# Patient Record
Sex: MALE | Race: WHITE | Employment: OTHER | URBAN - METROPOLITAN AREA
[De-identification: names, ages, dates, MRNs, and addresses within clinical notes are randomized per-mention and may not be internally consistent; named-entity substitution may affect disease eponyms.]

---

## 2017-01-04 ENCOUNTER — APPOINTMENT (OUTPATIENT)
Dept: CT IMAGING | Facility: HOSPITAL | Age: 77
DRG: 197 | End: 2017-01-04
Attending: EMERGENCY MEDICINE
Payer: MEDICARE

## 2017-01-04 ENCOUNTER — HOSPITAL ENCOUNTER (INPATIENT)
Facility: HOSPITAL | Age: 77
LOS: 2 days | Discharge: HOME | DRG: 197 | End: 2017-01-06
Attending: EMERGENCY MEDICINE | Admitting: INTERNAL MEDICINE
Payer: MEDICARE

## 2017-01-04 DIAGNOSIS — R06.00 DYSPNEA ON EXERTION: Primary | ICD-10-CM

## 2017-01-04 DIAGNOSIS — R77.8 ELEVATED TROPONIN: ICD-10-CM

## 2017-01-04 PROBLEM — J96.21 ACUTE ON CHRONIC RESPIRATORY FAILURE WITH HYPOXEMIA (HCC): Status: ACTIVE | Noted: 2017-01-04

## 2017-01-04 LAB
ALBUMIN SERPL-MCNC: 3.2 G/DL (ref 3.5–4.8)
ALLENS TEST: POSITIVE
ALP LIVER SERPL-CCNC: 60 U/L (ref 45–117)
ALT SERPL-CCNC: 23 U/L (ref 17–63)
APTT PPP: 35.3 SECONDS (ref 25–34)
ARTERIAL BLD GAS O2 SATURATION: 96 % (ref 92–100)
ARTERIAL BLOOD GAS BASE EXCESS: -0.6
ARTERIAL BLOOD GAS HCO3: 22.7 MEQ/L (ref 22–26)
ARTERIAL BLOOD GAS PCO2: 33 MM HG (ref 35–45)
ARTERIAL BLOOD GAS PH: 7.45 (ref 7.35–7.45)
ARTERIAL BLOOD GAS PO2: 94 MM HG (ref 80–105)
AST SERPL-CCNC: 16 U/L (ref 15–41)
ATRIAL RATE: 77 BPM
BASOPHILS # BLD AUTO: 0.02 X10(3) UL (ref 0–0.1)
BASOPHILS NFR BLD AUTO: 0.3 %
BILIRUB SERPL-MCNC: 0.4 MG/DL (ref 0.1–2)
BUN BLD-MCNC: 14 MG/DL (ref 8–20)
CALCIUM BLD-MCNC: 8.7 MG/DL (ref 8.3–10.3)
CALCULATED O2 SATURATION: 98 % (ref 92–100)
CARBOXYHEMOGLOBIN: 1.7 % SAT (ref 0–3)
CHLORIDE: 103 MMOL/L (ref 101–111)
CO2: 27 MMOL/L (ref 22–32)
CREAT BLD-MCNC: 1.38 MG/DL (ref 0.7–1.3)
D-DIMER: 2.25 UG/ML FEU (ref 0–0.49)
EOSINOPHIL # BLD AUTO: 0.17 X10(3) UL (ref 0–0.3)
EOSINOPHIL NFR BLD AUTO: 2.4 %
ERYTHROCYTE [DISTWIDTH] IN BLOOD BY AUTOMATED COUNT: 15.5 % (ref 11.5–16)
GLUCOSE BLD-MCNC: 173 MG/DL (ref 65–99)
GLUCOSE BLD-MCNC: 248 MG/DL (ref 70–99)
HCT VFR BLD AUTO: 39.2 % (ref 37–53)
HGB BLD-MCNC: 12.7 G/DL (ref 13–17)
IMMATURE GRANULOCYTE COUNT: 0.03 X10(3) UL (ref 0–1)
IMMATURE GRANULOCYTE RATIO %: 0.4 %
INR BLD: 1.06 (ref 0.89–1.12)
L/M: 6 L/MIN
LYMPHOCYTES # BLD AUTO: 1.21 X10(3) UL (ref 0.9–4)
LYMPHOCYTES NFR BLD AUTO: 16.9 %
M PROTEIN MFR SERPL ELPH: 7 G/DL (ref 6.1–8.3)
MCH RBC QN AUTO: 25.4 PG (ref 27–33.2)
MCHC RBC AUTO-ENTMCNC: 32.4 G/DL (ref 31–37)
MCV RBC AUTO: 78.4 FL (ref 80–99)
METHEMOGLOBIN: 0.4 % SAT (ref 0.4–1.5)
MONOCYTES # BLD AUTO: 0.49 X10(3) UL (ref 0.1–0.6)
MONOCYTES NFR BLD AUTO: 6.8 %
NEUTROPHIL ABS PRELIM: 5.24 X10 (3) UL (ref 1.3–6.7)
NEUTROPHILS # BLD AUTO: 5.24 X10(3) UL (ref 1.3–6.7)
NEUTROPHILS NFR BLD AUTO: 73.2 %
P AXIS: 15 DEGREES
P-R INTERVAL: 154 MS
PATIENT TEMPERATURE: 98.6 F
PLATELET # BLD AUTO: 197 10(3)UL (ref 150–450)
POTASSIUM SERPL-SCNC: 4.8 MMOL/L (ref 3.6–5.1)
PSA SERPL DL<=0.01 NG/ML-MCNC: 14.1 SECONDS (ref 12.3–14.8)
Q-T INTERVAL: 394 MS
QRS DURATION: 78 MS
QTC CALCULATION (BEZET): 445 MS
R AXIS: -34 DEGREES
RBC # BLD AUTO: 5 X10(6)UL (ref 3.8–5.8)
RED CELL DISTRIBUTION WIDTH-SD: 42.8 FL (ref 35.1–46.3)
SODIUM SERPL-SCNC: 138 MMOL/L (ref 136–144)
T AXIS: -55 DEGREES
TOTAL HEMOGLOBIN: 13.6 G/DL (ref 12.6–17.4)
TROPONIN: 0.2 NG/ML (ref ?–0.05)
TROPONIN: 0.68 NG/ML (ref ?–0.05)
VENTRICULAR RATE: 77 BPM
WBC # BLD AUTO: 7.2 X10(3) UL (ref 4–13)

## 2017-01-04 PROCEDURE — 71250 CT THORAX DX C-: CPT

## 2017-01-04 PROCEDURE — 71275 CT ANGIOGRAPHY CHEST: CPT

## 2017-01-04 PROCEDURE — 99223 1ST HOSP IP/OBS HIGH 75: CPT | Performed by: INTERNAL MEDICINE

## 2017-01-04 RX ORDER — FUROSEMIDE 40 MG/1
40 TABLET ORAL DAILY
Status: ON HOLD | COMMUNITY
End: 2017-02-13

## 2017-01-04 RX ORDER — SODIUM CHLORIDE 9 MG/ML
INJECTION, SOLUTION INTRAVENOUS CONTINUOUS
Status: ACTIVE | OUTPATIENT
Start: 2017-01-04 | End: 2017-01-04

## 2017-01-04 RX ORDER — ONDANSETRON 2 MG/ML
4 INJECTION INTRAMUSCULAR; INTRAVENOUS EVERY 6 HOURS PRN
Status: DISCONTINUED | OUTPATIENT
Start: 2017-01-04 | End: 2017-01-06

## 2017-01-04 RX ORDER — HEPARIN SODIUM 5000 [USP'U]/ML
5000 INJECTION, SOLUTION INTRAVENOUS; SUBCUTANEOUS EVERY 8 HOURS
Status: DISCONTINUED | OUTPATIENT
Start: 2017-01-04 | End: 2017-01-06

## 2017-01-04 RX ORDER — CLOPIDOGREL BISULFATE 75 MG/1
75 TABLET ORAL DAILY
Status: DISCONTINUED | OUTPATIENT
Start: 2017-01-04 | End: 2017-01-06

## 2017-01-04 RX ORDER — PRAVASTATIN SODIUM 10 MG
10 TABLET ORAL NIGHTLY
Status: DISCONTINUED | OUTPATIENT
Start: 2017-01-04 | End: 2017-01-06

## 2017-01-04 RX ORDER — FUROSEMIDE 40 MG/1
40 TABLET ORAL DAILY
Status: DISCONTINUED | OUTPATIENT
Start: 2017-01-04 | End: 2017-01-06

## 2017-01-04 RX ORDER — ECHINACEA PURPUREA EXTRACT 125 MG
1 TABLET ORAL 4 TIMES DAILY PRN
Status: DISCONTINUED | OUTPATIENT
Start: 2017-01-04 | End: 2017-01-06

## 2017-01-04 RX ORDER — GABAPENTIN 300 MG/1
300 CAPSULE ORAL 3 TIMES DAILY
Status: DISCONTINUED | OUTPATIENT
Start: 2017-01-04 | End: 2017-01-06

## 2017-01-04 RX ORDER — POLYETHYLENE GLYCOL 3350 17 G/17G
17 POWDER, FOR SOLUTION ORAL DAILY PRN
Status: DISCONTINUED | OUTPATIENT
Start: 2017-01-04 | End: 2017-01-06

## 2017-01-04 RX ORDER — FUROSEMIDE 20 MG/1
20 TABLET ORAL
Status: DISCONTINUED | OUTPATIENT
Start: 2017-01-04 | End: 2017-01-04

## 2017-01-04 RX ORDER — DEXTROSE MONOHYDRATE 25 G/50ML
50 INJECTION, SOLUTION INTRAVENOUS
Status: DISCONTINUED | OUTPATIENT
Start: 2017-01-04 | End: 2017-01-06

## 2017-01-04 RX ORDER — ATENOLOL 25 MG/1
12.5 TABLET ORAL DAILY
Status: DISCONTINUED | OUTPATIENT
Start: 2017-01-04 | End: 2017-01-06

## 2017-01-04 RX ORDER — BISACODYL 10 MG
10 SUPPOSITORY, RECTAL RECTAL
Status: DISCONTINUED | OUTPATIENT
Start: 2017-01-04 | End: 2017-01-06

## 2017-01-04 RX ORDER — ONDANSETRON 2 MG/ML
4 INJECTION INTRAMUSCULAR; INTRAVENOUS EVERY 4 HOURS PRN
Status: DISCONTINUED | OUTPATIENT
Start: 2017-01-04 | End: 2017-01-04

## 2017-01-04 RX ORDER — ACETAMINOPHEN 325 MG/1
650 TABLET ORAL EVERY 6 HOURS PRN
Status: DISCONTINUED | OUTPATIENT
Start: 2017-01-04 | End: 2017-01-06

## 2017-01-04 RX ORDER — ECHINACEA PURPUREA EXTRACT 125 MG
1 TABLET ORAL 4 TIMES DAILY PRN
COMMUNITY

## 2017-01-04 NOTE — ED INITIAL ASSESSMENT (HPI)
Pt here via ems with sob and dizziness hx of pulmonary fibrosis . Pt states he became sob while using the stairs using oxygen per n/c @8L. Denies fever, occassional productive cough pt stating normal for him.  Per ems oxygen sat 94% on oxygen

## 2017-01-04 NOTE — ED PROVIDER NOTES
Patient Seen in: [facility] Emergency Department    History   Patient presents with:  Dyspnea DEJAH SOB (respiratory)  Dizziness (neurologic)    Stated Complaint: sob with dizziness    HPI    Patient is a 78-year-old male, with history of pulmonary fi AA TID   insulin detemir 100 UNIT/ML Subcutaneous Solution Pen-injector,  Inject 40 Units into the skin nightly. Potassium Chloride ER 20 MEQ Oral Tab CR,  Take 1 tablet (20 mEq total) by mouth daily.    furosemide 20 MG Oral Tab,  Take 1 tablet (20 mg Alendronate Sodium (FOSAMAX) 70 MG Oral Tab,  Take 70 mg by mouth once a week. Take with full glass of water. Remain upright for 30 min. May eat after 30 min. Takes on Sundays.    cyanocobalamin (VITAMIN B12) 1000 MCG/ML Injection Solution,  Inject 1,000 m evidence of trauma. Extraocular muscles are intact. Pupils are equal and reactive to light. Oropharynx is pink and moist.  NECK: Neck is supple and nontender. The trachea is midline. LUNGS: Fine rales throughout, respirations are unlabored.    HEART: Regu DIFFERENTIAL WITH PLATELET    Narrative: The following orders were created for panel order CBC WITH DIFFERENTIAL WITH PLATELET.   Procedure                               Abnormality         Status                     --------- cavitary regions is again noted. No evidence of a focal consolidation. 1/4/2017  CONCLUSION:   No evidence of pulmonary embolus. Marked interstitial lung disease is stable.     Dictated by: Pito Hoskins MD on 1/04/2017 at 18:03     Approved by: Gloria Silva This high-resolution chest CT examination is designed for evaluation of interstitial lung disease, bronchiectasis and emphysema and is therefore not the examination of choice for adenopathy or other more general indications.     Dictated by: Yaz Martins

## 2017-01-05 ENCOUNTER — APPOINTMENT (OUTPATIENT)
Dept: CV DIAGNOSTICS | Facility: HOSPITAL | Age: 77
DRG: 197 | End: 2017-01-05
Attending: INTERNAL MEDICINE
Payer: MEDICARE

## 2017-01-05 LAB
ATRIAL RATE: 83 BPM
BUN BLD-MCNC: 15 MG/DL (ref 8–20)
CALCIUM BLD-MCNC: 8.7 MG/DL (ref 8.3–10.3)
CHLORIDE: 106 MMOL/L (ref 101–111)
CO2: 30 MMOL/L (ref 22–32)
CREAT BLD-MCNC: 1.2 MG/DL (ref 0.7–1.3)
EST. AVERAGE GLUCOSE BLD GHB EST-MCNC: 203 MG/DL (ref 68–126)
GLUCOSE BLD-MCNC: 109 MG/DL (ref 70–99)
GLUCOSE BLD-MCNC: 112 MG/DL (ref 65–99)
GLUCOSE BLD-MCNC: 132 MG/DL (ref 65–99)
GLUCOSE BLD-MCNC: 154 MG/DL (ref 65–99)
GLUCOSE BLD-MCNC: 95 MG/DL (ref 65–99)
HAV IGM SER QL: 2 MG/DL (ref 1.7–3)
HBA1C MFR BLD HPLC: 8.7 % (ref ?–5.7)
P AXIS: 39 DEGREES
P-R INTERVAL: 158 MS
POTASSIUM SERPL-SCNC: 3.9 MMOL/L (ref 3.6–5.1)
Q-T INTERVAL: 374 MS
QRS DURATION: 80 MS
QTC CALCULATION (BEZET): 439 MS
R AXIS: -34 DEGREES
SODIUM SERPL-SCNC: 140 MMOL/L (ref 136–144)
T AXIS: -44 DEGREES
TROPONIN: 0.6 NG/ML (ref ?–0.05)
VENTRICULAR RATE: 83 BPM

## 2017-01-05 PROCEDURE — 99233 SBSQ HOSP IP/OBS HIGH 50: CPT | Performed by: INTERNAL MEDICINE

## 2017-01-05 PROCEDURE — 93306 TTE W/DOPPLER COMPLETE: CPT | Performed by: INTERNAL MEDICINE

## 2017-01-05 PROCEDURE — 93306 TTE W/DOPPLER COMPLETE: CPT

## 2017-01-05 RX ORDER — FUROSEMIDE 10 MG/ML
20 INJECTION INTRAMUSCULAR; INTRAVENOUS ONCE
Status: COMPLETED | OUTPATIENT
Start: 2017-01-05 | End: 2017-01-05

## 2017-01-05 NOTE — HISTORICAL OFFICE NOTE
Lindsay Height  : 1940  ACCOUNT:  [id number]  [phone]  PCP: Dr. Diana Lynn     TODAY'S DATE: 2016  DICTATED BY:  [Dr. Gracy Aparicio ]    CHIEF COMPLAINT: [Followup of History of PTCA.]    HPI:    [On 2016, Arnaldo Prom, a 77year old m catheterization 2003, ct scan AAA 07, infrarenal aneurysm and MI    FAMILY HISTORY: Negative for premature CAD. Negative for AAA. SOCIAL HISTORY: SMOKING: Former tobacco use. quit 2/02/03, smoked 2ppd X 48 yrs.  CAFFEINE: 3-4 beverages daily and other none evidence for any leaking. He has peripheral vascular disease, without claudication. He has diabetes with neuropathy and mild carotid disease.  He has had a history of PVCs with nonsustained ventricular tachycardia and supraventricular tachycardia, and has b

## 2017-01-05 NOTE — PLAN OF CARE
DX: Dyspnea on Exertion - worse than normal; pt with hx of pulmonary fibrosis & wears 02 8 - 10L @ home currently - now on 6L NC    Data:  Pt sitting @ side of bed. Pt denies any CP or nausea. Pt is SOB w/ exertion.   Pt denies any SOB @ rest.  VS: 125/81

## 2017-01-05 NOTE — H&P
[facility]  History and Physical     Jud Anel Patient Status:  Emergency    1940 MRN [id number]   Location [address] Attending No att. providers found   [facility] Day # 0 PCP Blaine Villa MD     Chief Complain Right HAND BONE GROWTH REMOVED    OTHER Right ROTATOR CUFF REPAIR    OTHER  AAA STENT    OTHER  PROSTATE LASER SURGERY    COLONOSCOPY  9/12/2014    Comment Procedure: COLONOSCOPY;  Surgeon: Lisa Kirkland MD;  Location: [facility] ENDOSCOPY    ANGIOGRAM      CA breakfastIf on insulin, test blood glucose 3 times per day before mealsNon-Medicare:  Test blood glucose 4-5 times per day before meals, bedtime and for signs, symptoms of hypoglycemia or as ordered by physician Disp: 50 strip Rfl: 6   FREESTYLE LANCETS Do of Systems:   A comprehensive 14 point review of systems was completed. Pertinent positives and negatives noted in the HPI.     Physical Exam:    /85 mmHg  Pulse 74  Temp(Src) 97.4 °F (36.3 °C) (Temporal)  Resp 16  Ht 6' (1.829 m)  Wt 226 lb (102.5 elevated to 0.196 on admission. EKG with some ST depressions in precordial leads, unchanged from EKG several months ago. 3. Trend troponin  4. Cardiology consult  5. TTE  3. Chronic diastolic heart failure  1. Continue home lasix  4.  Pulmonary hypertensio

## 2017-01-05 NOTE — PROGRESS NOTES
01/04/17 1932 01/04/17 1936 01/04/17 1939   Vital Signs   Pulse 80 78 81   Heart Rate Source Monitor --  --    Resp 18 --  --    Respiratory Quality Normal --  --    /76 mmHg 123/67 mmHg 125/68 mmHg   BP Location Right leg Left arm Left arm   BP M

## 2017-01-05 NOTE — CONSULTS
[address]    PATIENT'S NAME: Trina Tyler   ATTENDING PHYSICIAN: Jovanni Schaefer MD   CONSULTING PHYSICIAN: Sony Denny MD   PATIENT ACCOUNT#:   [id number]    LOCATION:  [address] [place] [address]  MEDICAL RECORD #:   [id number]       DATE OF BIRTH:  [birth year] Fosamax, metformin, Plavix, atenolol. ALLERGIES:  Lipitor, which causes myalgias; penicillin, which causes hives; and Zocor, which causes myalgias. SOCIAL HISTORY:  Quit smoking in 2003.     FAMILY HISTORY:  There is no premature coronary artery disea 197.    ASSESSMENT AND PLAN:  A 50-year-old gentleman with shortness of breath. 1.   Shortness of breath. He has severe pulmonary fibrosis, for which he is on a high dose of home oxygen.   I believe that the progression of this disease is the most lik

## 2017-01-05 NOTE — PROGRESS NOTES
[facility]  Progress Note     Jud Tam Patient Status:  Inpatient    1940 MRN [id number]   [facility] 2NE-A Attending Leonor Gutierrez MD   Hosp Day # 1 PCP Blaine Villa MD     Chief Complaint: SOB    S: Patient withou Subcutaneous TID CC   • furosemide  40 mg Oral Daily       ASSESSMENT / PLAN:     1. Worsening dyspnea on exertion  1. Repeat high resolution CT unchanged compared to previous images  2. CT PE protocol negative  3. Repeat TTE pending  4.  Pulmonary consulte

## 2017-01-05 NOTE — CONSULTS
Cardiology consult abnormal troponin    Progressively worsening SOB with pulmonary fibrosis on 10L home O2  History of CAD  No CP  Chronic LE edema with stasis changes  ECG with TW inversions anteriorly which are new from 02/2016    Plan     Repeat troponi

## 2017-01-05 NOTE — PLAN OF CARE
2018: Paged Dr. Migdalia Kenny (o/c for [facility]) to notify of new consult. 2020: Paged Dr. Henrietta Kelley (o/c for pulmonology) to notify of new consult. Dr. Migdalia Kenny returned page - notified him of consult & elevated troponin of 0.196.   Per Dr. Migdalia Kenny \"I'll b

## 2017-01-05 NOTE — CM/SW NOTE
01/05/17 1200   CM/SW Referral Data   Referral Source Social Work (self-referral)   Reason for Referral Discharge planning   Informant Patient   Pertinent Medical Hx   Primary Care Physician Name Dr. Nelson Kwok and states that he knows he needs to \"follow up on some things\". Patient has a history of [facility] at [facility], history of [facility] and a history of outpatient pulmonary rehab.  Patient does not feel that he will need any of these services when he is sta

## 2017-01-05 NOTE — PROGRESS NOTES
[facility] Cardiology Progress Note  Subjective:  Up in room without pain - Feels breathing is back to baseline    Objective:  135/73  Afebrile   SR  I/O - 523        BUN/cr 15/1.20  Troponin 0.604   SR with NSVT    Neuro:awake/alert  HEENT:noJVD, moist

## 2017-01-05 NOTE — CONSULTS
[facility]  Report of Consultation    Sukhwinder Baker Patient Status:  Inpatient    1940 MRN [id number]   [facility] 2NE-A Attending Quang Michaud MD   Hosp Day # 1 PCP Marcus Oliveira MD     Reason for Consultation  Acute on Rfl:  1/4/2017 at 1000   Saline Nasal Spray 0.65 % Nasal Solution 1 spray by Nasal route 4 (four) times daily as needed for congestion. Disp:  Rfl:  1/3/2017 at 2300   Cholecalciferol (VITAMIN D3) 1000 UNITS Oral Cap Take 2 tablets by mouth daily.  Disp:  R Systems:    Constitutional: Slight increase in weight  Eyes: Eyes about change  Ears, nose, mouth, throat, and face: Denies any aspiration tendency or throat  Respiratory: As above  Cardiovascular: Denies any palpitations or chest pain  Gastrointestinal: D deformity. Heart: Regular rate and rhythm 1 episode of nonsustained V. tach   Abdomen: soft, non-tender, non-distended, no masses, no guaSlight increase in lower extremityrding, no     rebound.    Extremity:  edema, no cyanosis   Skin: No rashes or lesion neuropathy    #History of coronary disease status post PCI-elevated troponin on with cardiology following      Workup as per cardiology  Plan to resume O2 walks and titrate FiO2 as possible  Discussed at length with plans to return to pulmonary rehab  Cont

## 2017-01-06 VITALS
TEMPERATURE: 98 F | WEIGHT: 212.5 LBS | BODY MASS INDEX: 28.78 KG/M2 | HEIGHT: 72 IN | RESPIRATION RATE: 18 BRPM | DIASTOLIC BLOOD PRESSURE: 78 MMHG | HEART RATE: 78 BPM | SYSTOLIC BLOOD PRESSURE: 113 MMHG | OXYGEN SATURATION: 100 %

## 2017-01-06 LAB
BUN BLD-MCNC: 20 MG/DL (ref 8–20)
CALCIUM BLD-MCNC: 9.1 MG/DL (ref 8.3–10.3)
CHLORIDE: 105 MMOL/L (ref 101–111)
CO2: 28 MMOL/L (ref 22–32)
CREAT BLD-MCNC: 1.2 MG/DL (ref 0.7–1.3)
GLUCOSE BLD-MCNC: 106 MG/DL (ref 65–99)
GLUCOSE BLD-MCNC: 115 MG/DL (ref 65–99)
GLUCOSE BLD-MCNC: 99 MG/DL (ref 70–99)
HAV IGM SER QL: 2.2 MG/DL (ref 1.7–3)
POTASSIUM SERPL-SCNC: 3.9 MMOL/L (ref 3.6–5.1)
SODIUM SERPL-SCNC: 140 MMOL/L (ref 136–144)

## 2017-01-06 PROCEDURE — 99239 HOSP IP/OBS DSCHRG MGMT >30: CPT | Performed by: INTERNAL MEDICINE

## 2017-01-06 NOTE — PROGRESS NOTES
[facility]  Progress Note    Fabiola Devine Patient Status:  Inpatient    1940 MRN [id number]   [facility] 2NE-A Attending Juany Fritz MD   Hosp Day # 2 PCP Herlinda Walters MD     Subjective:  Fabiola Devine is a(n) 77 year ol respiratory failure with hypoxemia (HCC)     Elevated troponin     Chronic diastolic heart failure (HCC)     Type 2 diabetes mellitus with complication (HCC)     Pulmonary HTN (HCC)      Impression    #Idiopathic pulmonary fibrosis remains on OFEV with slo

## 2017-01-06 NOTE — PROGRESS NOTES
[facility]  Progress Note     Radha Cedillo Patient Status:  Inpatient    1940 MRN [id number]   [facility] 2NE-A Attending Cyntha Leventhal, MD   Hosp Day # 2 PCP Neil Choi MD     Chief Complaint: SOB    S: Patient withou mg Oral TID   • insulin detemir  40 Units Subcutaneous Nightly   • Pravastatin Sodium  10 mg Oral Nightly   • insulin aspart  1-68 Units Subcutaneous TID CC   • furosemide  40 mg Oral Daily       ASSESSMENT / PLAN:     1. Worsening dyspnea on exertion  1.

## 2017-01-06 NOTE — PROGRESS NOTES
[facility] Cardiology Progress Note    Subjective:  Feeling good, back to his baseline.      Objective:  /75 mmHg  Pulse 85  Temp(Src) 97.7 °F (36.5 °C) (Oral)  Resp 18  Ht 6' (1.829 m)  Wt 212 lb 8.4 oz (96.4 kg)  BMI 28.82 kg/m2  SpO2 90%    Telemetry:

## 2017-01-06 NOTE — PLAN OF CARE
CARDIOVASCULAR - ADULT    • Maintains optimal cardiac output and hemodynamic stability Progressing    • Absence of cardiac arrhythmias or at baseline Progressing        Diabetes/Glucose Control    • Glucose maintained within prescribed range Progressing

## 2017-01-06 NOTE — CM/SW NOTE
Asked by primary rn to talk to patient regarding o2 tank for ride home.   Does not have a portable tank available, and does not have anyone with his house key that could pick his up and bring to the hospital.  Call to [employer], patient's company

## 2017-01-06 NOTE — DISCHARGE SUMMARY
[facility]  DISCHARGE SUMMARY     Ana Tapia Patient Status:  Inpatient    1940 MRN [id number]   [facility] 2NE-A Attending Jaime Castillo MD   Hosp Day # 2 PCP Nichole Carias MD     Date of Admission: 2017  Date of Denies recent chest pain, orthopnea, PND, wheezing, fevers. Denies irritant exposure, travel, sick contacts. Brief Synopsis: Pt was admitted and monitored on telemetry. He had repeat CT scan that was unchanged to previous studies.  He was seen by Pulm a MULTIVITAMIN & MINERAL OR        Take 1 tablet by mouth daily. Refills:  0       OFEV 150 MG Caps   Generic drug:  Nintedanib Esylate        Take 1 capsule by mouth 2 (two) times daily.     Refills:  0       Potassium Chloride ER 20 MEQ Tbcr   Comm INSTRUCTIONS: See electronic chart    Elisha Wilson MD 1/6/2017    Time spent:  > 30 minutes

## 2017-01-09 NOTE — CM/SW NOTE
01/09/17 0800   Discharge disposition   Discharged to: Home or [address] [place] [address] after discharge None   Discharge transportation Private car   Patient discharged 1/6/17.

## 2017-01-24 ENCOUNTER — HOSPITAL ENCOUNTER (EMERGENCY)
Facility: HOSPITAL | Age: 77
Discharge: HOME | End: 2017-01-25
Attending: EMERGENCY MEDICINE
Payer: MEDICARE

## 2017-01-24 DIAGNOSIS — J34.89 PERFORATED NASAL SEPTUM: Primary | ICD-10-CM

## 2017-01-24 PROCEDURE — 99283 EMERGENCY DEPT VISIT LOW MDM: CPT

## 2017-01-25 VITALS
SYSTOLIC BLOOD PRESSURE: 134 MMHG | RESPIRATION RATE: 14 BRPM | DIASTOLIC BLOOD PRESSURE: 76 MMHG | TEMPERATURE: 98 F | BODY MASS INDEX: 30.61 KG/M2 | HEIGHT: 72 IN | OXYGEN SATURATION: 96 % | WEIGHT: 226 LBS | HEART RATE: 81 BPM

## 2017-01-25 NOTE — ED PROVIDER NOTES
Patient Seen in: [facility] Emergency Department    History   Patient presents with:  FB in Nose (nasopharyngeal)    Stated Complaint: fb to right nostril. patient appears sob. on oxygen.     HPI    Patient is a 81-year-old male coming in with [facility] Chloride ER 20 MEQ Oral Tab CR,  Take 1 tablet (20 mEq total) by mouth daily. acetaminophen 325 MG Oral Tab,  Take 2 tablets (650 mg total) by mouth every 4 (four) hours as needed.    gabapentin (NEURONTIN) 300 MG Oral Cap,  Take 1 capsule (300 mg total) Temp src 01/24/17 2337 Temporal   SpO2 01/24/17 2337 96 %   O2 Device 01/24/17 2335 Nasal cannula       Current:/76 mmHg  Pulse 81  Temp(Src) 98.1 °F (36.7 °C) (Temporal)  Resp 14  Ht 182.9 cm (6')  Wt 102.513 kg  BMI 30.64 kg/m2  SpO2 96%        P

## 2017-01-25 NOTE — ED INITIAL ASSESSMENT (HPI)
Pt states he felt as though he had a FB in his nose. He blew his nose and felt a pop sensation around 7pm. Pt denies SOB.

## 2017-01-26 ENCOUNTER — CARDPULM VISIT (OUTPATIENT)
Dept: CARDIAC REHAB | Facility: HOSPITAL | Age: 77
End: 2017-01-26
Attending: INTERNAL MEDICINE
Payer: MEDICARE

## 2017-01-26 VITALS
BODY MASS INDEX: 29.8 KG/M2 | OXYGEN SATURATION: 94 % | WEIGHT: 220 LBS | HEIGHT: 72 IN | DIASTOLIC BLOOD PRESSURE: 60 MMHG | RESPIRATION RATE: 12 BRPM | SYSTOLIC BLOOD PRESSURE: 96 MMHG | HEART RATE: 88 BPM

## 2017-01-26 DIAGNOSIS — J84.10 PULMONARY FIBROSIS (HCC): Primary | ICD-10-CM

## 2017-01-26 PROCEDURE — 94620 HC PULMONARY STRESS TEST SIMPLE (6 MIN WALK): CPT

## 2017-01-30 ENCOUNTER — APPOINTMENT (OUTPATIENT)
Dept: CARDIAC REHAB | Facility: HOSPITAL | Age: 77
End: 2017-01-30
Attending: INTERNAL MEDICINE
Payer: MEDICARE

## 2017-02-01 ENCOUNTER — CARDPULM VISIT (OUTPATIENT)
Dept: CARDIAC REHAB | Facility: HOSPITAL | Age: 77
End: 2017-02-01
Attending: INTERNAL MEDICINE
Payer: MEDICARE

## 2017-02-03 ENCOUNTER — CARDPULM VISIT (OUTPATIENT)
Dept: CARDIAC REHAB | Facility: HOSPITAL | Age: 77
End: 2017-02-03
Attending: INTERNAL MEDICINE
Payer: MEDICARE

## 2017-02-06 ENCOUNTER — CARDPULM VISIT (OUTPATIENT)
Dept: CARDIAC REHAB | Facility: HOSPITAL | Age: 77
End: 2017-02-06
Attending: INTERNAL MEDICINE
Payer: MEDICARE

## 2017-02-08 ENCOUNTER — CARDPULM VISIT (OUTPATIENT)
Dept: CARDIAC REHAB | Facility: HOSPITAL | Age: 77
End: 2017-02-08
Attending: INTERNAL MEDICINE
Payer: MEDICARE

## 2017-02-10 ENCOUNTER — CARDPULM VISIT (OUTPATIENT)
Dept: CARDIAC REHAB | Facility: HOSPITAL | Age: 77
End: 2017-02-10
Attending: INTERNAL MEDICINE
Payer: MEDICARE

## 2017-02-10 NOTE — HISTORICAL OFFICE NOTE
: 1940  ACCOUNT:  [id number]  [id number]  PCP: Dr. Maegan Sanchez     TODAY'S DATE: 2016  DICTATED BY:  [Dr. David Tinajero ]    CHIEF COMPLAINT: [Followup of History of PTCA.]    HPI:    [On 2016, Whitmire Ama, a 77year old male, presente scan AAA 07, infrarenal aneurysm and MI    FAMILY HISTORY: Negative for premature CAD. Negative for AAA. SOCIAL HISTORY: SMOKING: Former tobacco use. quit 2/02/03, smoked 2ppd X 48 yrs. CAFFEINE: 3-4 beverages daily and other none caf. . ALCOHOL: denies  He has peripheral vascular disease, without claudication. He has diabetes with neuropathy and mild carotid disease.  He has had a history of PVCs with nonsustained ventricular tachycardia and supraventricular tachycardia, and has been doing well on beta-blo

## 2017-02-13 ENCOUNTER — HOSPITAL ENCOUNTER (OUTPATIENT)
Dept: INTERVENTIONAL RADIOLOGY/VASCULAR | Facility: HOSPITAL | Age: 77
Discharge: HOME | End: 2017-02-13
Attending: INTERNAL MEDICINE | Admitting: INTERNAL MEDICINE
Payer: MEDICARE

## 2017-02-13 VITALS
TEMPERATURE: 98 F | RESPIRATION RATE: 19 BRPM | DIASTOLIC BLOOD PRESSURE: 101 MMHG | HEIGHT: 72 IN | HEART RATE: 86 BPM | OXYGEN SATURATION: 99 % | WEIGHT: 218 LBS | BODY MASS INDEX: 29.53 KG/M2 | SYSTOLIC BLOOD PRESSURE: 134 MMHG

## 2017-02-13 DIAGNOSIS — J84.10 PULMONARY FIBROSIS (HCC): ICD-10-CM

## 2017-02-13 LAB — GLUCOSE BLD-MCNC: 152 MG/DL (ref 65–99)

## 2017-02-13 PROCEDURE — 99152 MOD SED SAME PHYS/QHP 5/>YRS: CPT

## 2017-02-13 PROCEDURE — 93463 DRUG ADMIN & HEMODYNMIC MEAS: CPT

## 2017-02-13 PROCEDURE — 99153 MOD SED SAME PHYS/QHP EA: CPT

## 2017-02-13 PROCEDURE — 93451 RIGHT HEART CATH: CPT

## 2017-02-13 PROCEDURE — 4A023N6 MEASUREMENT OF CARDIAC SAMPLING AND PRESSURE, RIGHT HEART, PERCUTANEOUS APPROACH: ICD-10-PCS | Performed by: INTERNAL MEDICINE

## 2017-02-13 PROCEDURE — 3E083KZ INTRODUCTION OF OTHER DIAGNOSTIC SUBSTANCE INTO HEART, PERCUTANEOUS APPROACH: ICD-10-PCS | Performed by: INTERNAL MEDICINE

## 2017-02-13 PROCEDURE — 82962 GLUCOSE BLOOD TEST: CPT

## 2017-02-13 RX ORDER — FUROSEMIDE 40 MG/1
40 TABLET ORAL EVERY OTHER DAY
Qty: 30 TABLET | Refills: 5 | Status: SHIPPED | OUTPATIENT
Start: 2017-02-13 | End: 2017-07-26

## 2017-02-13 RX ORDER — LIDOCAINE HYDROCHLORIDE 10 MG/ML
INJECTION, SOLUTION INFILTRATION; PERINEURAL
Status: COMPLETED
Start: 2017-02-13 | End: 2017-02-13

## 2017-02-13 RX ORDER — MIDAZOLAM HYDROCHLORIDE 1 MG/ML
INJECTION INTRAMUSCULAR; INTRAVENOUS
Status: COMPLETED
Start: 2017-02-13 | End: 2017-02-13

## 2017-02-13 RX ORDER — SODIUM CHLORIDE 9 MG/ML
INJECTION, SOLUTION INTRAVENOUS CONTINUOUS
Status: DISCONTINUED | OUTPATIENT
Start: 2017-02-13 | End: 2017-02-13

## 2017-02-13 RX ORDER — HEPARIN SODIUM 5000 [USP'U]/ML
INJECTION, SOLUTION INTRAVENOUS; SUBCUTANEOUS
Status: COMPLETED
Start: 2017-02-13 | End: 2017-02-13

## 2017-02-13 RX ORDER — FUROSEMIDE 20 MG/1
20 TABLET ORAL EVERY OTHER DAY
Qty: 30 TABLET | Refills: 5 | Status: SHIPPED | OUTPATIENT
Start: 2017-02-13 | End: 2017-07-26

## 2017-02-13 RX ORDER — SODIUM NITROPRUSSIDE 25 MG/ML
INJECTION INTRAVENOUS
Status: COMPLETED
Start: 2017-02-13 | End: 2017-02-13

## 2017-02-13 RX ORDER — POTASSIUM CHLORIDE 20 MEQ/1
20 TABLET, EXTENDED RELEASE ORAL EVERY OTHER DAY
Qty: 30 TABLET | Refills: 0 | Status: SHIPPED | OUTPATIENT
Start: 2017-02-13 | End: 2017-07-26

## 2017-02-13 RX ORDER — POTASSIUM CHLORIDE 750 MG/1
10 TABLET, EXTENDED RELEASE ORAL EVERY OTHER DAY
Qty: 30 TABLET | Refills: 5 | Status: ON HOLD | OUTPATIENT
Start: 2017-02-13 | End: 2018-08-07

## 2017-02-13 RX ADMIN — SODIUM CHLORIDE: 9 INJECTION, SOLUTION INTRAVENOUS at 10:00:00

## 2017-02-13 NOTE — H&P
History reviewed and up to date. No changes to H&P. The patient is scheduled for RHC with possible vasodilator challenge due to dyspnea and lung disease. Pt referred by Dr. Dameon Tellez for evaluation of pulmonary HTN lasix was increased recently.     Patient significant visual changes. ENMT: no ear pain, discharge, or difficulty hearing. CV: no chest pains. RESP: dyspnea and on O2. GI: denies melena, hematochezia. : no hematuria. INTEG: no new rashes, lesions. MS: no limiting arthritis.  NEURO: no slurred spe aorta. FEM: normal. PEDAL: pedal pulses intact. EXT: trace edema and venous stasis changes.         ASSESSMENT:  1. . CAD, of native vessels  2. Pulmonary Fibrosis, Unspecified  3. Carotid artery stenosis mult vessel < 50%  4.  MI, anterior wall, follow-up Clopidogrel Bisulfate 75MG      one tablet daily                          04/22/13 Gabapentin            300MG     one capsule three times daily             10/03/11 Cyanocobalamin        1000MCG/  one injection monthly                     08/24/09 Multivi

## 2017-02-13 NOTE — PROGRESS NOTES
Pt tolerated recovery period without issue. Right neck site C/D/I. Discharge instructions reviewed with pt as well as new medication changes. Pt verbalizes understanding. IV d/beatrice and pt brought to lobby via wheelchair.

## 2017-02-14 NOTE — PROCEDURES
[facility]    PATIENT'S NAME: Sonya Smith   ATTENDING PHYSICIAN: Tiff Pfeiffer M.D. OPERATING PHYSICIAN: Tiff Pfeiffer M.D.    PATIENT ACCOUNT#:   [de-identified]    LOCATION:  [facility] 6 EDWP 10  MEDICAL RECORD #:   [id number]       DATE OF B no bleeding, no hematoma on the right side of the neck. The patient tolerated the procedure very well and was transferred to the cath lab holding area for monitoring. There were no immediate post-cardiac-catheterization complications.       Right Heart Ca gradient dropped from 25 down 16 mmHg, and cardiac index went slightly up from 5.2 to 5.5 L/min. 2.   There was no intracardiac shunt by quick saturation run. 3.   Hypovolemia. PLAN:    1.    Recommend sildenafil 20 mg p.o. daily or Adcirca 20 mg once PRISCA Turner M.D.

## 2017-02-15 ENCOUNTER — CARDPULM VISIT (OUTPATIENT)
Dept: CARDIAC REHAB | Facility: HOSPITAL | Age: 77
End: 2017-02-15
Attending: INTERNAL MEDICINE
Payer: MEDICARE

## 2017-02-17 ENCOUNTER — APPOINTMENT (OUTPATIENT)
Dept: CARDIAC REHAB | Facility: HOSPITAL | Age: 77
End: 2017-02-17
Attending: INTERNAL MEDICINE
Payer: MEDICARE

## 2017-02-20 ENCOUNTER — CARDPULM VISIT (OUTPATIENT)
Dept: CARDIAC REHAB | Facility: HOSPITAL | Age: 77
End: 2017-02-20
Attending: INTERNAL MEDICINE
Payer: MEDICARE

## 2017-02-20 ENCOUNTER — PRIOR ORIGINAL RECORDS (OUTPATIENT)
Dept: OTHER | Age: 77
End: 2017-02-20

## 2017-02-22 ENCOUNTER — CARDPULM VISIT (OUTPATIENT)
Dept: CARDIAC REHAB | Facility: HOSPITAL | Age: 77
End: 2017-02-22
Attending: INTERNAL MEDICINE
Payer: MEDICARE

## 2017-02-24 ENCOUNTER — CARDPULM VISIT (OUTPATIENT)
Dept: CARDIAC REHAB | Facility: HOSPITAL | Age: 77
End: 2017-02-24
Attending: INTERNAL MEDICINE
Payer: MEDICARE

## 2017-02-25 ENCOUNTER — PRIOR ORIGINAL RECORDS (OUTPATIENT)
Dept: OTHER | Age: 77
End: 2017-02-25

## 2017-02-27 ENCOUNTER — CARDPULM VISIT (OUTPATIENT)
Dept: CARDIAC REHAB | Facility: HOSPITAL | Age: 77
End: 2017-02-27
Attending: INTERNAL MEDICINE
Payer: MEDICARE

## 2017-02-28 ENCOUNTER — PRIOR ORIGINAL RECORDS (OUTPATIENT)
Dept: OTHER | Age: 77
End: 2017-02-28

## 2017-02-28 ENCOUNTER — LAB ENCOUNTER (OUTPATIENT)
Dept: LAB | Age: 77
End: 2017-02-28
Attending: INTERNAL MEDICINE
Payer: MEDICARE

## 2017-02-28 DIAGNOSIS — D63.1 ANEMIA IN CHRONIC KIDNEY DISEASE(285.21): ICD-10-CM

## 2017-02-28 DIAGNOSIS — N18.9 ANEMIA IN CHRONIC KIDNEY DISEASE(285.21): ICD-10-CM

## 2017-02-28 DIAGNOSIS — R06.03 ACUTE RESPIRATORY DISTRESS: Primary | ICD-10-CM

## 2017-02-28 DIAGNOSIS — E11.9 DIABETES MELLITUS (HCC): ICD-10-CM

## 2017-02-28 LAB
BETA NATRIURETIC PEPTIDE: 202 PG/ML (ref 2–99)
BUN BLD-MCNC: 18 MG/DL (ref 8–20)
CALCIUM BLD-MCNC: 9.4 MG/DL (ref 8.3–10.3)
CHLORIDE: 105 MMOL/L (ref 101–111)
CO2: 28 MMOL/L (ref 22–32)
CREAT BLD-MCNC: 1.38 MG/DL (ref 0.7–1.3)
GLUCOSE BLD-MCNC: 216 MG/DL (ref 70–99)
POTASSIUM SERPL-SCNC: 4.6 MMOL/L (ref 3.6–5.1)
SODIUM SERPL-SCNC: 141 MMOL/L (ref 136–144)

## 2017-02-28 PROCEDURE — 36415 COLL VENOUS BLD VENIPUNCTURE: CPT

## 2017-02-28 PROCEDURE — 80048 BASIC METABOLIC PNL TOTAL CA: CPT

## 2017-02-28 PROCEDURE — 83880 ASSAY OF NATRIURETIC PEPTIDE: CPT

## 2017-03-01 ENCOUNTER — CARDPULM VISIT (OUTPATIENT)
Dept: CARDIAC REHAB | Facility: HOSPITAL | Age: 77
End: 2017-03-01
Attending: INTERNAL MEDICINE
Payer: MEDICARE

## 2017-03-02 LAB
BNP: 219 PMOL/L
BUN: 18 MG/DL
CALCIUM: 9.7 MG/DL
CHLORIDE: 104 MEQ/L
CREATININE, SERUM: 1.18 MG/DL
GLUCOSE: 162 MG/DL
HEMATOCRIT: 41 %
HEMOGLOBIN: 13.1 G/DL
PLATELETS: 216 K/UL
POTASSIUM, SERUM: 4.8 MEQ/L
RED BLOOD COUNT: 5.3 X 10-6/U
SODIUM: 140 MEQ/L
WHITE BLOOD COUNT: 8.2 X 10-3/U

## 2017-03-03 ENCOUNTER — PRIOR ORIGINAL RECORDS (OUTPATIENT)
Dept: OTHER | Age: 77
End: 2017-03-03

## 2017-03-03 ENCOUNTER — CARDPULM VISIT (OUTPATIENT)
Dept: CARDIAC REHAB | Facility: HOSPITAL | Age: 77
End: 2017-03-03
Attending: INTERNAL MEDICINE
Payer: MEDICARE

## 2017-03-03 LAB
BNP: 202 PMOL/L
BUN: 18 MG/DL
CALCIUM: 9.4 MG/DL
CHLORIDE: 105 MEQ/L
CREATININE, SERUM: 1.38 MG/DL
GLUCOSE: 216 MG/DL
POTASSIUM, SERUM: 4.6 MEQ/L
SODIUM: 141 MEQ/L

## 2017-03-06 ENCOUNTER — PRIOR ORIGINAL RECORDS (OUTPATIENT)
Dept: OTHER | Age: 77
End: 2017-03-06

## 2017-03-06 ENCOUNTER — APPOINTMENT (OUTPATIENT)
Dept: CARDIAC REHAB | Facility: HOSPITAL | Age: 77
End: 2017-03-06
Attending: INTERNAL MEDICINE
Payer: MEDICARE

## 2017-03-08 ENCOUNTER — CARDPULM VISIT (OUTPATIENT)
Dept: CARDIAC REHAB | Facility: HOSPITAL | Age: 77
End: 2017-03-08
Attending: INTERNAL MEDICINE
Payer: MEDICARE

## 2017-03-08 ENCOUNTER — PRIOR ORIGINAL RECORDS (OUTPATIENT)
Dept: OTHER | Age: 77
End: 2017-03-08

## 2017-03-10 ENCOUNTER — CARDPULM VISIT (OUTPATIENT)
Dept: CARDIAC REHAB | Facility: HOSPITAL | Age: 77
End: 2017-03-10
Attending: INTERNAL MEDICINE
Payer: MEDICARE

## 2017-03-13 ENCOUNTER — CARDPULM VISIT (OUTPATIENT)
Dept: CARDIAC REHAB | Facility: HOSPITAL | Age: 77
End: 2017-03-13
Attending: INTERNAL MEDICINE
Payer: MEDICARE

## 2017-03-13 ENCOUNTER — PRIOR ORIGINAL RECORDS (OUTPATIENT)
Dept: OTHER | Age: 77
End: 2017-03-13

## 2017-03-20 ENCOUNTER — CARDPULM VISIT (OUTPATIENT)
Dept: CARDIAC REHAB | Facility: HOSPITAL | Age: 77
End: 2017-03-20
Attending: INTERNAL MEDICINE
Payer: MEDICARE

## 2017-03-22 ENCOUNTER — CARDPULM VISIT (OUTPATIENT)
Dept: CARDIAC REHAB | Facility: HOSPITAL | Age: 77
End: 2017-03-22
Attending: INTERNAL MEDICINE
Payer: MEDICARE

## 2017-03-24 ENCOUNTER — CARDPULM VISIT (OUTPATIENT)
Dept: CARDIAC REHAB | Facility: HOSPITAL | Age: 77
End: 2017-03-24
Attending: INTERNAL MEDICINE
Payer: MEDICARE

## 2017-03-29 ENCOUNTER — CARDPULM VISIT (OUTPATIENT)
Dept: CARDIAC REHAB | Facility: HOSPITAL | Age: 77
End: 2017-03-29
Attending: INTERNAL MEDICINE
Payer: MEDICARE

## 2017-03-31 ENCOUNTER — CARDPULM VISIT (OUTPATIENT)
Dept: CARDIAC REHAB | Facility: HOSPITAL | Age: 77
End: 2017-03-31
Attending: INTERNAL MEDICINE
Payer: MEDICARE

## 2017-04-07 ENCOUNTER — APPOINTMENT (OUTPATIENT)
Dept: CARDIAC REHAB | Facility: HOSPITAL | Age: 77
End: 2017-04-07
Attending: INTERNAL MEDICINE
Payer: MEDICARE

## 2017-04-17 ENCOUNTER — CARDPULM VISIT (OUTPATIENT)
Dept: CARDIAC REHAB | Facility: HOSPITAL | Age: 77
End: 2017-04-17
Attending: INTERNAL MEDICINE
Payer: MEDICARE

## 2017-04-18 ENCOUNTER — PRIOR ORIGINAL RECORDS (OUTPATIENT)
Dept: OTHER | Age: 77
End: 2017-04-18

## 2017-04-19 ENCOUNTER — PRIOR ORIGINAL RECORDS (OUTPATIENT)
Dept: OTHER | Age: 77
End: 2017-04-19

## 2017-04-19 ENCOUNTER — CARDPULM VISIT (OUTPATIENT)
Dept: CARDIAC REHAB | Facility: HOSPITAL | Age: 77
End: 2017-04-19
Attending: INTERNAL MEDICINE
Payer: MEDICARE

## 2017-04-20 ENCOUNTER — PRIOR ORIGINAL RECORDS (OUTPATIENT)
Dept: OTHER | Age: 77
End: 2017-04-20

## 2017-04-21 ENCOUNTER — PRIOR ORIGINAL RECORDS (OUTPATIENT)
Dept: OTHER | Age: 77
End: 2017-04-21

## 2017-04-21 ENCOUNTER — CARDPULM VISIT (OUTPATIENT)
Dept: CARDIAC REHAB | Facility: HOSPITAL | Age: 77
End: 2017-04-21
Attending: INTERNAL MEDICINE
Payer: MEDICARE

## 2017-04-26 ENCOUNTER — PRIOR ORIGINAL RECORDS (OUTPATIENT)
Dept: OTHER | Age: 77
End: 2017-04-26

## 2017-05-08 ENCOUNTER — CARDPULM VISIT (OUTPATIENT)
Dept: CARDIAC REHAB | Facility: HOSPITAL | Age: 77
End: 2017-05-08
Attending: INTERNAL MEDICINE
Payer: MEDICARE

## 2017-05-09 ENCOUNTER — TELEPHONE (OUTPATIENT)
Dept: NEUROLOGY | Facility: CLINIC | Age: 77
End: 2017-05-09

## 2017-05-09 DIAGNOSIS — G62.89 PERIPHERAL AXONAL NEUROPATHY: Primary | ICD-10-CM

## 2017-05-10 NOTE — TELEPHONE ENCOUNTER
Medication: Gabapentin 300 mg     Date of last refill: 2/15/16  [place]    Last office visit: 8/31/16  Due back to clinic per last office note: 5 months   Date next office visit scheduled: None scheduled     Last OV n

## 2017-05-10 NOTE — TELEPHONE ENCOUNTER
Noted patient due for a follow up appointment in clinic. Left message for patient to call back. When returns call please help patient schedule follow up appointment with Dr Barrera Prabhakar and then refill can be processed.

## 2017-05-12 ENCOUNTER — CARDPULM VISIT (OUTPATIENT)
Dept: CARDIAC REHAB | Facility: HOSPITAL | Age: 77
End: 2017-05-12
Attending: INTERNAL MEDICINE
Payer: MEDICARE

## 2017-05-15 ENCOUNTER — CARDPULM VISIT (OUTPATIENT)
Dept: CARDIAC REHAB | Facility: HOSPITAL | Age: 77
End: 2017-05-15
Attending: INTERNAL MEDICINE
Payer: MEDICARE

## 2017-05-15 RX ORDER — GABAPENTIN 300 MG/1
CAPSULE ORAL
Qty: 270 CAPSULE | Refills: 0 | Status: SHIPPED | OUTPATIENT
Start: 2017-05-15 | End: 2017-07-17

## 2017-05-15 NOTE — TELEPHONE ENCOUNTER
Spoke with patient regarding below. He is unwilling to schedule a follow up appointment now, stating \"the doctor can't do anything for me anyway except prescribe me medication, and now she wants to hold my prescription hostage for an appointment? \"  Ashlie Hung

## 2017-05-16 NOTE — TELEPHONE ENCOUNTER
Patient calling asking about RX. Patient informed RX was sent to [facility] yesterday. States he called [employer] today and they did not receive it. Informed patient we have receipt from pharmacy they received it. Patient verbalizes understanding.

## 2017-05-19 ENCOUNTER — CARDPULM VISIT (OUTPATIENT)
Dept: CARDIAC REHAB | Facility: HOSPITAL | Age: 77
End: 2017-05-19
Attending: INTERNAL MEDICINE
Payer: MEDICARE

## 2017-05-22 ENCOUNTER — CARDPULM VISIT (OUTPATIENT)
Dept: CARDIAC REHAB | Facility: HOSPITAL | Age: 77
End: 2017-05-22
Attending: INTERNAL MEDICINE
Payer: MEDICARE

## 2017-05-24 ENCOUNTER — RT VISIT (OUTPATIENT)
Dept: RESPIRATORY THERAPY | Facility: HOSPITAL | Age: 77
End: 2017-05-24
Attending: INTERNAL MEDICINE
Payer: MEDICARE

## 2017-05-24 ENCOUNTER — CARDPULM VISIT (OUTPATIENT)
Dept: CARDIAC REHAB | Facility: HOSPITAL | Age: 77
End: 2017-05-24
Attending: INTERNAL MEDICINE
Payer: MEDICARE

## 2017-05-24 DIAGNOSIS — J84.10 PULMONARY FIBROSIS (HCC): ICD-10-CM

## 2017-05-24 PROCEDURE — 94729 DIFFUSING CAPACITY: CPT

## 2017-05-24 PROCEDURE — 94726 PLETHYSMOGRAPHY LUNG VOLUMES: CPT

## 2017-05-24 PROCEDURE — 94010 BREATHING CAPACITY TEST: CPT

## 2017-05-24 NOTE — PROCEDURES
Spirometry, flow volume loop and lung volumes are all normal.  There is no evidence of any restriction or obstruction. There is severe decrease in the diffusing capacity. Compared to the previous PFT dated 7. 1.04  there  has been change in FEV1 from 3.5

## 2017-05-31 ENCOUNTER — CARDPULM VISIT (OUTPATIENT)
Dept: CARDIAC REHAB | Facility: HOSPITAL | Age: 77
End: 2017-05-31
Attending: INTERNAL MEDICINE
Payer: MEDICARE

## 2017-06-02 ENCOUNTER — CARDPULM VISIT (OUTPATIENT)
Dept: CARDIAC REHAB | Facility: HOSPITAL | Age: 77
End: 2017-06-02
Attending: INTERNAL MEDICINE
Payer: MEDICARE

## 2017-06-09 ENCOUNTER — CARDPULM VISIT (OUTPATIENT)
Dept: CARDIAC REHAB | Facility: HOSPITAL | Age: 77
End: 2017-06-09
Attending: INTERNAL MEDICINE
Payer: MEDICARE

## 2017-06-12 ENCOUNTER — CARDPULM VISIT (OUTPATIENT)
Dept: CARDIAC REHAB | Facility: HOSPITAL | Age: 77
End: 2017-06-12
Attending: INTERNAL MEDICINE
Payer: MEDICARE

## 2017-06-14 ENCOUNTER — CARDPULM VISIT (OUTPATIENT)
Dept: CARDIAC REHAB | Facility: HOSPITAL | Age: 77
End: 2017-06-14
Attending: INTERNAL MEDICINE
Payer: MEDICARE

## 2017-06-23 ENCOUNTER — APPOINTMENT (OUTPATIENT)
Dept: CARDIAC REHAB | Facility: HOSPITAL | Age: 77
End: 2017-06-23
Attending: INTERNAL MEDICINE
Payer: MEDICARE

## 2017-06-29 ENCOUNTER — NURSE ONLY (OUTPATIENT)
Dept: CARDIAC REHAB | Facility: HOSPITAL | Age: 77
End: 2017-06-29
Attending: FAMILY MEDICINE

## 2017-07-17 ENCOUNTER — PRIOR ORIGINAL RECORDS (OUTPATIENT)
Dept: OTHER | Age: 77
End: 2017-07-17

## 2017-07-17 DIAGNOSIS — G62.89 PERIPHERAL AXONAL NEUROPATHY: ICD-10-CM

## 2017-07-18 RX ORDER — GABAPENTIN 300 MG/1
300 CAPSULE ORAL 3 TIMES DAILY
Qty: 90 CAPSULE | Refills: 0 | Status: SHIPPED | OUTPATIENT
Start: 2017-07-18 | End: 2017-07-26

## 2017-07-18 NOTE — TELEPHONE ENCOUNTER
Medication: Gabapentin 300 mg     Date of last refill: 5/15/17  [place]    Last office visit: 8/31/16  Due back to clinic per last office note: PRN  Date next office visit scheduled:  Future Appointments  Date Time Pr

## 2017-07-18 NOTE — TELEPHONE ENCOUNTER
Noted patient was last seen in office 8/31/16  Attempted to reach patient to schedule follow up appt with Dr Mauricio Masters. Automated message stated number is disconnected.     Spoke with patient and explained he needs to scheduled a follow up appt with Dr Isbell Herb

## 2017-07-19 ENCOUNTER — APPOINTMENT (OUTPATIENT)
Dept: LAB | Age: 77
End: 2017-07-19
Attending: INTERNAL MEDICINE
Payer: MEDICARE

## 2017-07-19 DIAGNOSIS — J84.10 PULMONARY FIBROSIS (HCC): ICD-10-CM

## 2017-07-19 LAB
ALBUMIN SERPL-MCNC: 3.5 G/DL (ref 3.5–4.8)
ALP LIVER SERPL-CCNC: 58 U/L (ref 45–117)
ALT SERPL-CCNC: 21 U/L (ref 17–63)
AST SERPL-CCNC: 17 U/L (ref 15–41)
BILIRUB SERPL-MCNC: 0.4 MG/DL (ref 0.1–2)
BUN BLD-MCNC: 19 MG/DL (ref 8–20)
CALCIUM BLD-MCNC: 9.8 MG/DL (ref 8.3–10.3)
CHLORIDE: 106 MMOL/L (ref 101–111)
CO2: 27 MMOL/L (ref 22–32)
CREAT BLD-MCNC: 1.45 MG/DL (ref 0.7–1.3)
GLUCOSE BLD-MCNC: 93 MG/DL (ref 70–99)
M PROTEIN MFR SERPL ELPH: 7.6 G/DL (ref 6.1–8.3)
POTASSIUM SERPL-SCNC: 4.2 MMOL/L (ref 3.6–5.1)
SODIUM SERPL-SCNC: 139 MMOL/L (ref 136–144)

## 2017-07-19 PROCEDURE — 36415 COLL VENOUS BLD VENIPUNCTURE: CPT

## 2017-07-19 PROCEDURE — 80053 COMPREHEN METABOLIC PANEL: CPT

## 2017-07-26 ENCOUNTER — OFFICE VISIT (OUTPATIENT)
Dept: NEUROLOGY | Facility: CLINIC | Age: 77
End: 2017-07-26

## 2017-07-26 VITALS
WEIGHT: 218 LBS | BODY MASS INDEX: 30 KG/M2 | SYSTOLIC BLOOD PRESSURE: 107 MMHG | DIASTOLIC BLOOD PRESSURE: 60 MMHG | RESPIRATION RATE: 16 BRPM | HEART RATE: 88 BPM

## 2017-07-26 DIAGNOSIS — G62.89 PERIPHERAL AXONAL NEUROPATHY: ICD-10-CM

## 2017-07-26 PROCEDURE — 99212 OFFICE O/P EST SF 10 MIN: CPT | Performed by: PHYSICIAN ASSISTANT

## 2017-07-26 RX ORDER — FUROSEMIDE 20 MG/1
40 TABLET ORAL DAILY
COMMUNITY
End: 2018-03-14

## 2017-07-26 RX ORDER — GABAPENTIN 300 MG/1
300 CAPSULE ORAL 3 TIMES DAILY
Qty: 270 CAPSULE | Refills: 3 | Status: ON HOLD | OUTPATIENT
Start: 2017-07-26 | End: 2018-08-07

## 2017-07-26 NOTE — PATIENT INSTRUCTIONS
Refill policies:    • Allow 2-3 business days for refills; controlled substances may take longer.   • Contact your pharmacy at least 5 days prior to running out of medication and have them send an electronic request or submit request through the [facility] have a procedure or additional testing performed. [facility]) will contact your insurance carrier to obtain pre-certification or prior authorization.     Unfortunately, [facility] has seen an increase in denial of payment even though the p

## 2017-07-26 NOTE — PROGRESS NOTES
[address] [facility] Patient Status:  No patient class for patient encounter    1940 MRN [id number]   Location [address] [facility], [address] [place] [address] Drive, [facility] Attending No att. providers found   Hosp Day # 0 PCP Maisha Land TRANSLUMINAL CORONARY ANGIO*  9/12/2014: COLONOSCOPY      Comment: Procedure: COLONOSCOPY;  Surgeon: Jennifer Harry MD;  Location:  ENDOSCOPY  No date: LAMINECTOMY,LUMBAR  HAND BONE GROWTH REMOVED: OTHER Right  ROTATOR CUFF REPAIR: OTHE UNIT/ML Subcutaneous Solution Pen-injector, Inject 20 Units into the skin 2 (two) times daily. , Disp: , Rfl:   •  Pravastatin Sodium (PRAVACHOL) 10 MG Oral Tab, Take 1 tablet by mouth daily.   , Disp: , Rfl:   •  OFEV 150 MG Oral Cap, Take 1 capsule by mo months    Assessment/Plan:  1. 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 Continue gabapentin 300mg TID  2. Continue working with specialist to improve DM  3.  Recommend walking as possible, elevating legs at rest, to decrease edema and for better cirucl

## 2017-07-28 ENCOUNTER — HOSPITAL ENCOUNTER (OUTPATIENT)
Dept: CV DIAGNOSTICS | Facility: HOSPITAL | Age: 77
Discharge: HOME | End: 2017-07-28
Attending: INTERNAL MEDICINE

## 2017-07-28 ENCOUNTER — MYAURORA ACCOUNT LINK (OUTPATIENT)
Dept: OTHER | Age: 77
End: 2017-07-28

## 2017-07-28 DIAGNOSIS — I25.10 CORONARY ARTERIOSCLEROSIS: ICD-10-CM

## 2017-07-28 DIAGNOSIS — R06.00 DYSPNEA, UNSPECIFIED TYPE: ICD-10-CM

## 2017-07-28 DIAGNOSIS — I27.20 PULMONARY HTN (HCC): ICD-10-CM

## 2017-08-04 ENCOUNTER — PRIOR ORIGINAL RECORDS (OUTPATIENT)
Dept: OTHER | Age: 77
End: 2017-08-04

## 2017-08-16 ENCOUNTER — PRIOR ORIGINAL RECORDS (OUTPATIENT)
Dept: OTHER | Age: 77
End: 2017-08-16

## 2017-09-25 ENCOUNTER — HOSPITAL ENCOUNTER (INPATIENT)
Facility: HOSPITAL | Age: 77
LOS: 2 days | Discharge: NURSING, REHAB OR LONG-TERM CARE | DRG: 312 | End: 2017-09-27
Attending: INTERNAL MEDICINE | Admitting: INTERNAL MEDICINE
Payer: MEDICARE

## 2017-09-25 ENCOUNTER — APPOINTMENT (OUTPATIENT)
Dept: GENERAL RADIOLOGY | Facility: HOSPITAL | Age: 77
DRG: 312 | End: 2017-09-25
Attending: INTERNAL MEDICINE
Payer: MEDICARE

## 2017-09-25 ENCOUNTER — PRIOR ORIGINAL RECORDS (OUTPATIENT)
Dept: OTHER | Age: 77
End: 2017-09-25

## 2017-09-25 DIAGNOSIS — R55 SYNCOPE, UNSPECIFIED SYNCOPE TYPE: Primary | ICD-10-CM

## 2017-09-25 PROCEDURE — 71010 XR CHEST AP PORTABLE  (CPT=71010): CPT | Performed by: INTERNAL MEDICINE

## 2017-09-25 PROCEDURE — 99221 1ST HOSP IP/OBS SF/LOW 40: CPT | Performed by: INTERNAL MEDICINE

## 2017-09-25 RX ORDER — GABAPENTIN 300 MG/1
300 CAPSULE ORAL 3 TIMES DAILY
Status: DISCONTINUED | OUTPATIENT
Start: 2017-09-25 | End: 2017-09-27

## 2017-09-25 RX ORDER — ATENOLOL 25 MG/1
12.5 TABLET ORAL
Status: DISCONTINUED | OUTPATIENT
Start: 2017-09-25 | End: 2017-09-27

## 2017-09-25 RX ORDER — PRAVASTATIN SODIUM 10 MG
10 TABLET ORAL NIGHTLY
Status: DISCONTINUED | OUTPATIENT
Start: 2017-09-25 | End: 2017-09-27

## 2017-09-25 RX ORDER — DEXTROSE MONOHYDRATE 25 G/50ML
50 INJECTION, SOLUTION INTRAVENOUS
Status: DISCONTINUED | OUTPATIENT
Start: 2017-09-25 | End: 2017-09-27

## 2017-09-25 RX ORDER — FUROSEMIDE 40 MG/1
40 TABLET ORAL DAILY
Status: DISCONTINUED | OUTPATIENT
Start: 2017-09-25 | End: 2017-09-27

## 2017-09-25 RX ORDER — CLOPIDOGREL BISULFATE 75 MG/1
75 TABLET ORAL DAILY
Status: DISCONTINUED | OUTPATIENT
Start: 2017-09-25 | End: 2017-09-27

## 2017-09-25 RX ORDER — SILDENAFIL CITRATE 20 MG/1
40 TABLET ORAL 3 TIMES DAILY
Status: DISCONTINUED | OUTPATIENT
Start: 2017-09-25 | End: 2017-09-27

## 2017-09-25 NOTE — CONSULTS
[facility]  Report of Consultation    Katie Daugherty Patient Status:  Inpatient    1940 MRN [id number]   [facility] 8NE-A Attending Nettie Metcalf MD   Hosp Day # 0 PCP Nikko Gutierrez MD     Reason for Consultation:  Pul;m fibros quit smoking about 16 years ago. He smoked 2.00 packs per day. He has never used smokeless tobacco. He reports that he drinks alcohol. He reports that he does not use drugs.     Allergies:    Darvon [Propoxyphen*      Penicillins                 Comment:UNS kg)  01/26/17 : 220 lb (99.8 kg)  01/24/17 : 226 lb (102.5 kg)  01/06/17 : 212 lb 8.4 oz (96.4 kg)  08/31/16 : 228 lb (103.4 kg)    Intake/Output:  [unfilled]  @IOTHIS SHIFT@    Lab Data Review:  No results for input(s): WBC, HGB, PLT, GLUCOSE in the l

## 2017-09-25 NOTE — H&P
Please refer to office note from today for any further details. We are admitting Mr. Pitts for recurrent and unexplained syncopal episodes. This has been occurring even at rest when he is in a chair. He denies any prodrome.    No chest pain, palpita denies drinking. EXERCISE: pulmonary rehab 2 times a week. DIET: no special diet. MARITAL STATUS: . OCCUPATION: .      ALLERGIES: Lipitor - Oral, muscle aches, Penicillins - CLASS, Hives, Pravachol - Oral, myalgia, Zocor - Oral and mus TABLETS EVERY MORNING                  02/10/15 *Atenolol             25MG      1/2 TABLET DAILY.                         07/17/17 Potassium Chloride [id number]     1 tab daily                              04/26/17 Sildenafil Citrate    20MG      2 TABLETS 3

## 2017-09-26 ENCOUNTER — APPOINTMENT (OUTPATIENT)
Dept: ULTRASOUND IMAGING | Facility: HOSPITAL | Age: 77
DRG: 312 | End: 2017-09-26
Attending: INTERNAL MEDICINE
Payer: MEDICARE

## 2017-09-26 PROCEDURE — 93880 EXTRACRANIAL BILAT STUDY: CPT | Performed by: INTERNAL MEDICINE

## 2017-09-26 PROCEDURE — 99223 1ST HOSP IP/OBS HIGH 75: CPT | Performed by: OTHER

## 2017-09-26 PROCEDURE — 99232 SBSQ HOSP IP/OBS MODERATE 35: CPT | Performed by: HOSPITALIST

## 2017-09-26 PROCEDURE — 95816 EEG AWAKE AND DROWSY: CPT | Performed by: OTHER

## 2017-09-26 NOTE — PROGRESS NOTES
[facility]  Progress Note     Thora Better Patient Status:  Inpatient    1940 MRN [id number]   [facility] 8NE-A Attending Jef Martinez MD   Hosp Day # 1 PCP Gabe Schmitz MD     Chief Complaint: syncope    S: Patient feels insulin detemir  20 Units Subcutaneous BID   • Insulin Aspart Pen  1-5 Units Subcutaneous TID CC and HS       ASSESSMENT / PLAN:     1. Recurrent syncope  1. Carotid US unremarkable  2. MRI/MRA brain pending  3. EEG unremarkable  4. Check orthostatics  2.

## 2017-09-26 NOTE — PROGRESS NOTES
ORTHOSTATIC BP'S:   LYING DOWN: 126/76, HR 73, 02 SATS 88% ON 02AT 8L PER NC  SITTIN/68, HR 71, 02 SATS 96% ON O2 AT 8L PER N.C  STANDIN/59, HR 87, 02 SATS 98% ON 02 AT 8L PER N.C. PT DENIES ANY DIZZINESS OR BEING LIGHTHEADED.

## 2017-09-26 NOTE — PROCEDURES
Date of Procedure: 9/26/2017    Procedure: EEG (ELECTROENCEPHALOGRAM)     DX: SYNCOPE  HX: PT IS A 77 Y/O MALE THAT PRESENTS FOR EVALUATION OF SYNCOPAL EPISODES. PT STATES HAS HAD FREQUENT BUT IRREGULAR SYNCOPAL EPISODES FOR PAST 3 MO.  PT STATES NO WARNING

## 2017-09-26 NOTE — PLAN OF CARE
Diabetes/Glucose Control    • Glucose maintained within prescribed range Progressing        NEUROLOGICAL - ADULT    • Achieves stable or improved neurological status Progressing        Patient/Family Goals    • Patient/Family Long Term Goal Progressing

## 2017-09-26 NOTE — HISTORICAL OFFICE NOTE
Licha Counts  : 1940  ACCOUNT:  [id number]  [phone]  PCP: Dr. Jesus Manuel De Santiago     TODAY'S DATE: 2017  DICTATED BY:  Chiquis Terrell M.D. ]    Ester Escamilla: [pulmonary hypertention.]    HPI:    [On 2017, Campbell Lewis the patient on sildenafil 20 mg p.o. 3 times a day. When he saw Dr. Dameon Tellez, for followup in April 2017, I asked to double the dose up to 40 mg p.o. 3 times a day. Dr. Nolan Long runs the patient OFEV and oxygen for his pulmonary fibrosis.  The patient is Diabetes, Ataxia, pulmonary fibrosis, recurrent cellulitis leg, right arm rotator cuff surgery and back surgery    PAST CV HISTORY: 2/02  stent to OM, AAA repair 2004, cardiac catheterization 2003, ct scan AAA 07, infrarenal aneurysm, MI and right cardiac II  7. Edema, localized  8. History of PTCA  9. History of smoking  10. Hypercholesteremia, pure  11. Hypertension, benign  12. Pulmonary Fibrosis, Unspecified  13. Renal insufficiency, chronic  14.  Varicose Veins Of Bilateral Lower Extremities With Other 1 tab daily                              04/26/17 Sildenafil Citrate    20MG      2 TABLETS 3 TIMES DAILY.                  03/08/16 Levemir FlexTouch     100UNIT/  as directed                              03/08/16 MetFORMIN HCl         1000MG    1 daily

## 2017-09-26 NOTE — CONSULTS
[facility]PITAL[facility]  [address] [place] [address] Patient Status:  Inpatient    1940 MRN [id number]   [facility] 8NE-A Attending Zhang Grant MD   Hosp Day # 0 PCP Colby Taylor MD     Reason for consult: recurrent syncope    Requeste Relation Age of Onset   • Hypertension Father    • Cancer Mother    • Cancer Sister        Allergies:   Darvon [Propoxyphen*      Penicillins                 Comment:UNSURE OF REACTION  Red Wine Extract            Medications:    No current facility-admini (MULTIVITAMIN & MINERAL OR) Take 1 tablet by mouth daily. Disp:  Rfl:    Clopidogrel Bisulfate (PLAVIX) 75 MG Oral Tab Take 75 mg by mouth daily. Disp:  Rfl:    Atenolol 25 MG Oral Tab Take 12.5 mg by mouth daily.    Disp:  Rfl:        Review of Systems:

## 2017-09-26 NOTE — PROGRESS NOTES
[facility]  Cardiology Progress Note    Subjective:  No chest pain or shortness of breath. Denies any current symptoms.   Verbalizes he often passes out for \"2-3 hours\" at a time but never witnessed, as it happens in his room at [facility] and he n Agree with above note and assessment. I've met this paitent in the past back in 2015. He carries a h/o asymptomatic occasional PVCs. Also h/o CAD s/p stent to OM many years ago. Continues to have inferior scar on echo.   Presents with 4 episodes over 2-

## 2017-09-27 ENCOUNTER — HOSPITAL ENCOUNTER (OUTPATIENT)
Dept: CV DIAGNOSTICS | Facility: HOSPITAL | Age: 77
Discharge: HOME | End: 2017-09-27
Attending: NURSE PRACTITIONER | Admitting: INTERNAL MEDICINE
Payer: MEDICARE

## 2017-09-27 ENCOUNTER — APPOINTMENT (OUTPATIENT)
Dept: MRI IMAGING | Facility: HOSPITAL | Age: 77
DRG: 312 | End: 2017-09-27
Attending: NURSE PRACTITIONER
Payer: MEDICARE

## 2017-09-27 VITALS
HEART RATE: 78 BPM | TEMPERATURE: 98 F | SYSTOLIC BLOOD PRESSURE: 102 MMHG | WEIGHT: 207.44 LBS | BODY MASS INDEX: 28 KG/M2 | DIASTOLIC BLOOD PRESSURE: 59 MMHG | RESPIRATION RATE: 18 BRPM | OXYGEN SATURATION: 94 %

## 2017-09-27 DIAGNOSIS — R55 SYNCOPE, UNSPECIFIED SYNCOPE TYPE: ICD-10-CM

## 2017-09-27 PROCEDURE — 70544 MR ANGIOGRAPHY HEAD W/O DYE: CPT | Performed by: NURSE PRACTITIONER

## 2017-09-27 PROCEDURE — 93270 REMOTE 30 DAY ECG REV/REPORT: CPT | Performed by: INTERNAL MEDICINE

## 2017-09-27 PROCEDURE — 70551 MRI BRAIN STEM W/O DYE: CPT | Performed by: NURSE PRACTITIONER

## 2017-09-27 PROCEDURE — 70547 MR ANGIOGRAPHY NECK W/O DYE: CPT | Performed by: NURSE PRACTITIONER

## 2017-09-27 PROCEDURE — 99232 SBSQ HOSP IP/OBS MODERATE 35: CPT | Performed by: HOSPITALIST

## 2017-09-27 PROCEDURE — 99233 SBSQ HOSP IP/OBS HIGH 50: CPT | Performed by: OTHER

## 2017-09-27 PROCEDURE — 93272 ECG/REVIEW INTERPRET ONLY: CPT | Performed by: INTERNAL MEDICINE

## 2017-09-27 RX ORDER — ACETAMINOPHEN 325 MG/1
650 TABLET ORAL EVERY 6 HOURS PRN
Status: DISCONTINUED | OUTPATIENT
Start: 2017-09-27 | End: 2017-09-27

## 2017-09-27 NOTE — PROGRESS NOTES
[facility]  Progress Note     Gricel Hyde Patient Status:  Inpatient    1940 MRN [id number]   [facility] 8NE-A Attending Piero Fierro MD   [facility] Day # 2 PCP Kristal Bazan MD     Chief Complaint: syncope    S: Patient seen a Oral TID   • Insulin Aspart Pen  1-5 Units Subcutaneous TID CC and HS       ASSESSMENT / PLAN:     1. Recurrent syncope  1. Carotid US unremarkable  2. MRI/MRA brain negative   3. EEG unremarkable  4. Negative orthostatics  5.  Plan to d/c with event monito

## 2017-09-27 NOTE — PROGRESS NOTES
[facility]  Progress Note    Lindaann Fall Patient Status:  Inpatient    1940 MRN [id number]   [facility] 8NE-A Attending Ryan Neumann MD   [facility] Day # 2 PCP Danya Farnsworth MD       Assessment and Plan:  Patient Active Problem Huang Knowles oz (94.3 kg)  07/26/17 : 218 lb (98.9 kg)      Allergies:    Darvon [Propoxyphen*      Penicillins                 Comment:UNSURE OF REACTION  Red Wine Extract            Physical Exam:  Blood pressure 111/58, pulse 75, temperature (!) 97.4 °F (36.3 °C), t

## 2017-09-27 NOTE — PLAN OF CARE
Diabetes/Glucose Control    • Glucose maintained within prescribed range Progressing        NEUROLOGICAL - ADULT    • Achieves stable or improved neurological status Progressing    -Pt A&OX4      Patient/Family Goals    • Patient/Family Long Term Goal Prog

## 2017-09-27 NOTE — PROGRESS NOTES
[id number] [facility] Progress Note    Seema Montez Patient Status:  Inpatient    1940 MRN [id number]   [facility] 8NE-A Attending Primo Little MD   [address] Day # 2 PCP Noemy Griffin MD         Subjective:  Seema Montez is a Left ventricle: The cavity size was normal. Wall thickness was normal.     Systolic function was at the lower limits of normal. The estimated     ejection fraction was 50-55%.  Doppler parameters are consistent with     abnormal left ventricular relaxation Active Problem List:     Leg pain     Pain in both feet     Peripheral axonal neuropathy     Patellofemoral arthritis     Effusion of lower leg joint     Chondromalacia of patella     Pneumonia     Pulmonary fibrosis (HCC)     Respiratory distress     At r Windy Bates MD   Neurology  [facility]  9/27/2017

## 2017-09-27 NOTE — PROGRESS NOTES
NURSING DISCHARGE NOTE    Discharged Other, (see nursing note) via Wheelchair. Accompanied by Support staff  Belongings Taken by patient/family     Pt alert and oriented.  Discharge instructions, education, medications, and follow up appointments all d

## 2017-10-03 NOTE — DISCHARGE SUMMARY
Arjun Oliva  [phone]  : 1940  ACCOUNT:  [id number]  PCP: Evelyn Aragon M.D., Kristine Hurtado M.D. CARDIOLOGIST: Jose Arrieta M.D.   Hospital: [facility]   Admitted: 2017  Discharged: 2017    DISCHARGE SUMMARY    DISCHARGE DIAGNOS MEDICATIONS: Levemir insulin 14 units subcu b.i.d., potassium chloride 10 mEq p.o. daily, Fosamax 70 mg p.o. once weekly, atenolol 12.5 mg p.o. daily, cyanocobalamin 1000 mcg injected once every 30 days, Lasix 40 mg p.o. daily, gabapentin 300 mg p.o. t.i.d

## 2017-10-05 ENCOUNTER — NURSE ONLY (OUTPATIENT)
Dept: CARDIAC REHAB | Facility: HOSPITAL | Age: 77
End: 2017-10-05
Attending: FAMILY MEDICINE

## 2017-10-16 ENCOUNTER — PRIOR ORIGINAL RECORDS (OUTPATIENT)
Dept: OTHER | Age: 77
End: 2017-10-16

## 2017-10-24 ENCOUNTER — PRIOR ORIGINAL RECORDS (OUTPATIENT)
Dept: OTHER | Age: 77
End: 2017-10-24

## 2017-10-25 ENCOUNTER — PRIOR ORIGINAL RECORDS (OUTPATIENT)
Dept: OTHER | Age: 77
End: 2017-10-25

## 2017-11-07 ENCOUNTER — OFFICE VISIT (OUTPATIENT)
Dept: SLEEP CENTER | Facility: HOSPITAL | Age: 77
End: 2017-11-07
Attending: INTERNAL MEDICINE
Payer: MEDICARE

## 2017-11-07 PROCEDURE — 95810 POLYSOM 6/> YRS 4/> PARAM: CPT

## 2017-11-09 NOTE — PROCEDURES
[address] [address]       Accredited by the [facility] of Sleep Medicine (AASM)    PATIENT'S NAME:        Althea Quintanilla PHYSICIAN:   Benita Pritchett M.D. REFERRING PHYSICIAN:   Kodak Salamanca M.D.   PATIENT based on an oxygen saturation more than or equal to 4 percent. Body position is documented via technician notes every 15 minutes. FINDINGS:  The study had lights out at 11:20 a.m. and lights on at 6:04 a.m. Total sleep time was 256 minutes.   Sleep ef 1.   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 ICD code F51.8. 2.   Periodic limb movement disorder. ICD code G47.61.   3.   Hypoxemia. PLAN:    1.    We will confer with the patient regarding the results of the

## 2017-11-21 ENCOUNTER — PRIOR ORIGINAL RECORDS (OUTPATIENT)
Dept: OTHER | Age: 77
End: 2017-11-21

## 2017-11-21 ENCOUNTER — MYAURORA ACCOUNT LINK (OUTPATIENT)
Dept: OTHER | Age: 77
End: 2017-11-21

## 2018-02-21 ENCOUNTER — PRIOR ORIGINAL RECORDS (OUTPATIENT)
Dept: OTHER | Age: 78
End: 2018-02-21

## 2018-02-26 ENCOUNTER — PRIOR ORIGINAL RECORDS (OUTPATIENT)
Dept: OTHER | Age: 78
End: 2018-02-26

## 2018-02-27 ENCOUNTER — HOSPITAL ENCOUNTER (OUTPATIENT)
Dept: CV DIAGNOSTICS | Facility: HOSPITAL | Age: 78
Discharge: HOME | End: 2018-02-27
Attending: INTERNAL MEDICINE

## 2018-02-27 ENCOUNTER — MYAURORA ACCOUNT LINK (OUTPATIENT)
Dept: OTHER | Age: 78
End: 2018-02-27

## 2018-02-27 ENCOUNTER — HOSPITAL ENCOUNTER (OUTPATIENT)
Dept: LAB | Facility: HOSPITAL | Age: 78
Discharge: HOME | End: 2018-02-27
Attending: INTERNAL MEDICINE
Payer: MEDICARE

## 2018-02-27 DIAGNOSIS — I27.29 OTHER SECONDARY PULMONARY HYPERTENSION (HCC): ICD-10-CM

## 2018-02-27 DIAGNOSIS — I25.10 CORONARY ARTERIOSCLEROSIS: ICD-10-CM

## 2018-02-27 LAB
ALBUMIN SERPL-MCNC: 3.9 G/DL (ref 3.5–4.8)
ALP LIVER SERPL-CCNC: 68 U/L (ref 45–117)
ALT SERPL-CCNC: 21 U/L (ref 17–63)
AST SERPL-CCNC: 19 U/L (ref 15–41)
BASOPHILS # BLD AUTO: 0.02 X10(3) UL (ref 0–0.1)
BASOPHILS NFR BLD AUTO: 0.2 %
BILIRUB SERPL-MCNC: 0.5 MG/DL (ref 0.1–2)
BUN BLD-MCNC: 20 MG/DL (ref 8–20)
CALCIUM BLD-MCNC: 9.7 MG/DL (ref 8.3–10.3)
CHLORIDE: 104 MMOL/L (ref 101–111)
CO2: 27 MMOL/L (ref 22–32)
CREAT BLD-MCNC: 1.21 MG/DL (ref 0.7–1.3)
EOSINOPHIL # BLD AUTO: 0.13 X10(3) UL (ref 0–0.3)
EOSINOPHIL NFR BLD AUTO: 1.5 %
ERYTHROCYTE [DISTWIDTH] IN BLOOD BY AUTOMATED COUNT: 18.4 % (ref 11.5–16)
GLUCOSE BLD-MCNC: 101 MG/DL (ref 70–99)
HCT VFR BLD AUTO: 46.8 % (ref 37–53)
HGB BLD-MCNC: 14 G/DL (ref 13–17)
IMMATURE GRANULOCYTE COUNT: 0.02 X10(3) UL (ref 0–1)
IMMATURE GRANULOCYTE RATIO %: 0.2 %
LYMPHOCYTES # BLD AUTO: 1.49 X10(3) UL (ref 0.9–4)
LYMPHOCYTES NFR BLD AUTO: 17.2 %
M PROTEIN MFR SERPL ELPH: 8.4 G/DL (ref 6.1–8.3)
MCH RBC QN AUTO: 22.7 PG (ref 27–33.2)
MCHC RBC AUTO-ENTMCNC: 29.9 G/DL (ref 31–37)
MCV RBC AUTO: 76 FL (ref 80–99)
MONOCYTES # BLD AUTO: 0.59 X10(3) UL (ref 0.1–1)
MONOCYTES NFR BLD AUTO: 6.8 %
NEUTROPHIL ABS PRELIM: 6.41 X10 (3) UL (ref 1.3–6.7)
NEUTROPHILS # BLD AUTO: 6.41 X10(3) UL (ref 1.3–6.7)
NEUTROPHILS NFR BLD AUTO: 74.1 %
PLATELET # BLD AUTO: 214 10(3)UL (ref 150–450)
POTASSIUM SERPL-SCNC: 4.2 MMOL/L (ref 3.6–5.1)
RBC # BLD AUTO: 6.16 X10(6)UL (ref 3.8–5.8)
RED CELL DISTRIBUTION WIDTH-SD: 45.6 FL (ref 35.1–46.3)
SODIUM SERPL-SCNC: 140 MMOL/L (ref 136–144)
WBC # BLD AUTO: 8.7 X10(3) UL (ref 4–13)

## 2018-02-27 PROCEDURE — 36415 COLL VENOUS BLD VENIPUNCTURE: CPT | Performed by: INTERNAL MEDICINE

## 2018-02-27 PROCEDURE — 85025 COMPLETE CBC W/AUTO DIFF WBC: CPT | Performed by: INTERNAL MEDICINE

## 2018-02-27 PROCEDURE — 80053 COMPREHEN METABOLIC PANEL: CPT | Performed by: INTERNAL MEDICINE

## 2018-03-01 ENCOUNTER — PRIOR ORIGINAL RECORDS (OUTPATIENT)
Dept: OTHER | Age: 78
End: 2018-03-01

## 2018-03-12 ENCOUNTER — PRIOR ORIGINAL RECORDS (OUTPATIENT)
Dept: OTHER | Age: 78
End: 2018-03-12

## 2018-03-13 PROBLEM — J34.89 NASAL SEPTAL PERFORATION: Status: ACTIVE | Noted: 2018-03-13

## 2018-03-13 PROBLEM — J34.89 NASAL VESTIBULITIS: Status: ACTIVE | Noted: 2018-03-13

## 2018-03-14 ENCOUNTER — APPOINTMENT (OUTPATIENT)
Dept: GENERAL RADIOLOGY | Facility: HOSPITAL | Age: 78
DRG: 197 | End: 2018-03-14
Attending: EMERGENCY MEDICINE
Payer: MEDICARE

## 2018-03-14 ENCOUNTER — LAB ENCOUNTER (OUTPATIENT)
Dept: LAB | Age: 78
End: 2018-03-14
Attending: INTERNAL MEDICINE
Payer: MEDICARE

## 2018-03-14 ENCOUNTER — HOSPITAL ENCOUNTER (INPATIENT)
Facility: HOSPITAL | Age: 78
LOS: 1 days | Discharge: NURSING, REHAB OR LONG-TERM CARE | DRG: 197 | End: 2018-03-16
Attending: EMERGENCY MEDICINE | Admitting: HOSPITALIST
Payer: MEDICARE

## 2018-03-14 DIAGNOSIS — I27.20 PULMONARY HYPERTENSION (HCC): ICD-10-CM

## 2018-03-14 DIAGNOSIS — E11.59 DIABETIC VASCULOPATHY (HCC): ICD-10-CM

## 2018-03-14 DIAGNOSIS — I25.9 CHRONIC ISCHEMIC HEART DISEASE, UNSPECIFIED: ICD-10-CM

## 2018-03-14 DIAGNOSIS — N18.30 CHRONIC KIDNEY DISEASE, STAGE III (MODERATE) (HCC): ICD-10-CM

## 2018-03-14 DIAGNOSIS — I51.9 MYXEDEMA HEART DISEASE: Primary | ICD-10-CM

## 2018-03-14 DIAGNOSIS — E55.9 VITAMIN D DEFICIENCY: ICD-10-CM

## 2018-03-14 DIAGNOSIS — R09.02 HYPOXIA: Primary | ICD-10-CM

## 2018-03-14 DIAGNOSIS — E03.9 MYXEDEMA HEART DISEASE: Primary | ICD-10-CM

## 2018-03-14 LAB
ADENOVIRUS PCR:: NEGATIVE
ALBUMIN SERPL-MCNC: 3.3 G/DL (ref 3.5–4.8)
ALP LIVER SERPL-CCNC: 55 U/L (ref 45–117)
ALT SERPL-CCNC: 20 U/L (ref 17–63)
AST SERPL-CCNC: 17 U/L (ref 15–41)
B PERT DNA SPEC QL NAA+PROBE: NEGATIVE
BASOPHILS # BLD AUTO: 0.02 X10(3) UL (ref 0–0.1)
BASOPHILS NFR BLD AUTO: 0.1 %
BILIRUB SERPL-MCNC: 0.4 MG/DL (ref 0.1–2)
BUN BLD-MCNC: 25 MG/DL (ref 8–20)
C PNEUM DNA SPEC QL NAA+PROBE: NEGATIVE
CALCIUM BLD-MCNC: 9.1 MG/DL (ref 8.3–10.3)
CHLORIDE: 106 MMOL/L (ref 101–111)
CO2: 24 MMOL/L (ref 22–32)
CORONAVIRUS 229E PCR:: NEGATIVE
CORONAVIRUS HKU1 PCR:: NEGATIVE
CORONAVIRUS NL63 PCR:: NEGATIVE
CORONAVIRUS OC43 PCR:: NEGATIVE
CREAT BLD-MCNC: 1.29 MG/DL (ref 0.7–1.3)
EOSINOPHIL # BLD AUTO: 0.08 X10(3) UL (ref 0–0.3)
EOSINOPHIL NFR BLD AUTO: 0.6 %
ERYTHROCYTE [DISTWIDTH] IN BLOOD BY AUTOMATED COUNT: 17.8 % (ref 11.5–16)
FLUAV RNA SPEC QL NAA+PROBE: NEGATIVE
FLUBV RNA SPEC QL NAA+PROBE: NEGATIVE
GLUCOSE BLD-MCNC: 113 MG/DL (ref 65–99)
GLUCOSE BLD-MCNC: 128 MG/DL (ref 70–99)
GLUCOSE BLD-MCNC: 155 MG/DL (ref 65–99)
HCT VFR BLD AUTO: 41.4 % (ref 37–53)
HGB BLD-MCNC: 12.9 G/DL (ref 13–17)
IMMATURE GRANULOCYTE COUNT: 0.05 X10(3) UL (ref 0–1)
IMMATURE GRANULOCYTE RATIO %: 0.4 %
INR BLD: 1.09 (ref 0.9–1.1)
LACTIC ACID: 2.5 MMOL/L (ref 0.5–2)
LYMPHOCYTES # BLD AUTO: 1.14 X10(3) UL (ref 0.9–4)
LYMPHOCYTES NFR BLD AUTO: 8.3 %
M PROTEIN MFR SERPL ELPH: 7.1 G/DL (ref 6.1–8.3)
MCH RBC QN AUTO: 23 PG (ref 27–33.2)
MCHC RBC AUTO-ENTMCNC: 31.2 G/DL (ref 31–37)
MCV RBC AUTO: 73.9 FL (ref 80–99)
METAPNEUMOVIRUS PCR:: NEGATIVE
MONOCYTES # BLD AUTO: 0.93 X10(3) UL (ref 0.1–1)
MONOCYTES NFR BLD AUTO: 6.8 %
MYCOPLASMA PNEUMONIA PCR:: NEGATIVE
NEUTROPHIL ABS PRELIM: 11.55 X10 (3) UL (ref 1.3–6.7)
NEUTROPHILS # BLD AUTO: 11.55 X10(3) UL (ref 1.3–6.7)
NEUTROPHILS NFR BLD AUTO: 83.8 %
PARAINFLUENZA 1 PCR:: NEGATIVE
PARAINFLUENZA 2 PCR:: NEGATIVE
PARAINFLUENZA 3 PCR:: NEGATIVE
PARAINFLUENZA 4 PCR:: NEGATIVE
PLATELET # BLD AUTO: 184 10(3)UL (ref 150–450)
POTASSIUM SERPL-SCNC: 4.6 MMOL/L (ref 3.6–5.1)
PRO-BETA NATRIURETIC PEPTIDE: 2418 PG/ML (ref ?–450)
PROCALCITONIN SERPL-MCNC: <0.11 NG/ML (ref ?–0.11)
PSA SERPL DL<=0.01 NG/ML-MCNC: 14.5 SECONDS (ref 12.4–14.7)
RBC # BLD AUTO: 5.6 X10(6)UL (ref 3.8–5.8)
RED CELL DISTRIBUTION WIDTH-SD: 45.2 FL (ref 35.1–46.3)
RHINOVIRUS/ENTERO PCR:: NEGATIVE
RSV RNA SPEC QL NAA+PROBE: NEGATIVE
SODIUM SERPL-SCNC: 139 MMOL/L (ref 136–144)
TROPONIN: <0.046 NG/ML (ref ?–0.05)
WBC # BLD AUTO: 13.8 X10(3) UL (ref 4–13)

## 2018-03-14 PROCEDURE — 84100 ASSAY OF PHOSPHORUS: CPT

## 2018-03-14 PROCEDURE — 84156 ASSAY OF PROTEIN URINE: CPT

## 2018-03-14 PROCEDURE — 99223 1ST HOSP IP/OBS HIGH 75: CPT | Performed by: HOSPITALIST

## 2018-03-14 PROCEDURE — 71045 X-RAY EXAM CHEST 1 VIEW: CPT | Performed by: EMERGENCY MEDICINE

## 2018-03-14 RX ORDER — DEXTROSE MONOHYDRATE 25 G/50ML
50 INJECTION, SOLUTION INTRAVENOUS
Status: DISCONTINUED | OUTPATIENT
Start: 2018-03-14 | End: 2018-03-16

## 2018-03-14 RX ORDER — FUROSEMIDE 40 MG/1
40 TABLET ORAL DAILY
Status: DISCONTINUED | OUTPATIENT
Start: 2018-03-15 | End: 2018-03-16

## 2018-03-14 RX ORDER — ONDANSETRON 2 MG/ML
4 INJECTION INTRAMUSCULAR; INTRAVENOUS EVERY 6 HOURS PRN
Status: DISCONTINUED | OUTPATIENT
Start: 2018-03-14 | End: 2018-03-16

## 2018-03-14 RX ORDER — ACETAMINOPHEN 325 MG/1
650 TABLET ORAL EVERY 4 HOURS PRN
Status: DISCONTINUED | OUTPATIENT
Start: 2018-03-14 | End: 2018-03-16

## 2018-03-14 RX ORDER — HEPARIN SODIUM 5000 [USP'U]/ML
5000 INJECTION, SOLUTION INTRAVENOUS; SUBCUTANEOUS EVERY 8 HOURS SCHEDULED
Status: DISCONTINUED | OUTPATIENT
Start: 2018-03-14 | End: 2018-03-16

## 2018-03-14 RX ORDER — ECHINACEA PURPUREA EXTRACT 125 MG
1 TABLET ORAL
Status: DISCONTINUED | OUTPATIENT
Start: 2018-03-14 | End: 2018-03-16

## 2018-03-14 RX ORDER — SILDENAFIL CITRATE 20 MG/1
40 TABLET ORAL 2 TIMES DAILY
Status: DISCONTINUED | OUTPATIENT
Start: 2018-03-14 | End: 2018-03-14

## 2018-03-14 RX ORDER — ALPRAZOLAM 0.25 MG/1
0.25 TABLET ORAL 3 TIMES DAILY PRN
Status: DISCONTINUED | OUTPATIENT
Start: 2018-03-14 | End: 2018-03-16

## 2018-03-14 RX ORDER — SILDENAFIL CITRATE 20 MG/1
40 TABLET ORAL 3 TIMES DAILY
Status: DISCONTINUED | OUTPATIENT
Start: 2018-03-15 | End: 2018-03-16

## 2018-03-14 RX ORDER — GABAPENTIN 300 MG/1
300 CAPSULE ORAL 3 TIMES DAILY
Status: DISCONTINUED | OUTPATIENT
Start: 2018-03-14 | End: 2018-03-16

## 2018-03-14 RX ORDER — BISACODYL 10 MG
10 SUPPOSITORY, RECTAL RECTAL
Status: DISCONTINUED | OUTPATIENT
Start: 2018-03-14 | End: 2018-03-16

## 2018-03-14 RX ORDER — FUROSEMIDE 40 MG/1
40 TABLET ORAL DAILY
Status: ON HOLD | COMMUNITY
End: 2018-06-29

## 2018-03-14 RX ORDER — FUROSEMIDE 10 MG/ML
40 INJECTION INTRAMUSCULAR; INTRAVENOUS ONCE
Status: COMPLETED | OUTPATIENT
Start: 2018-03-14 | End: 2018-03-14

## 2018-03-14 RX ORDER — POLYETHYLENE GLYCOL 3350 17 G/17G
17 POWDER, FOR SOLUTION ORAL DAILY PRN
Status: DISCONTINUED | OUTPATIENT
Start: 2018-03-14 | End: 2018-03-16

## 2018-03-14 RX ORDER — HYDROCODONE BITARTRATE AND ACETAMINOPHEN 5; 325 MG/1; MG/1
2 TABLET ORAL EVERY 4 HOURS PRN
Status: DISCONTINUED | OUTPATIENT
Start: 2018-03-14 | End: 2018-03-16

## 2018-03-14 RX ORDER — SILDENAFIL CITRATE 20 MG/1
40 TABLET ORAL 3 TIMES DAILY
Status: ON HOLD | COMMUNITY
End: 2018-08-07

## 2018-03-14 RX ORDER — ATENOLOL 25 MG/1
12.5 TABLET ORAL DAILY
Status: DISCONTINUED | OUTPATIENT
Start: 2018-03-15 | End: 2018-03-16

## 2018-03-14 RX ORDER — METHYLPREDNISOLONE SODIUM SUCCINATE 125 MG/2ML
80 INJECTION, POWDER, LYOPHILIZED, FOR SOLUTION INTRAMUSCULAR; INTRAVENOUS ONCE
Status: COMPLETED | OUTPATIENT
Start: 2018-03-14 | End: 2018-03-14

## 2018-03-14 RX ORDER — TRAZODONE HYDROCHLORIDE 50 MG/1
50 TABLET ORAL NIGHTLY PRN
Status: DISCONTINUED | OUTPATIENT
Start: 2018-03-14 | End: 2018-03-16

## 2018-03-14 RX ORDER — PRAVASTATIN SODIUM 10 MG
10 TABLET ORAL NIGHTLY
Status: DISCONTINUED | OUTPATIENT
Start: 2018-03-14 | End: 2018-03-16

## 2018-03-14 RX ORDER — HYDROCODONE BITARTRATE AND ACETAMINOPHEN 5; 325 MG/1; MG/1
1 TABLET ORAL EVERY 4 HOURS PRN
Status: DISCONTINUED | OUTPATIENT
Start: 2018-03-14 | End: 2018-03-16

## 2018-03-14 RX ORDER — ALPRAZOLAM 0.25 MG/1
0.25 TABLET ORAL 3 TIMES DAILY PRN
COMMUNITY

## 2018-03-14 RX ORDER — CLOPIDOGREL BISULFATE 75 MG/1
75 TABLET ORAL DAILY
Status: DISCONTINUED | OUTPATIENT
Start: 2018-03-15 | End: 2018-03-16

## 2018-03-14 NOTE — CONSULTS
[facility]  Report of Consultation    [place] Patient Status:  Inpatient    1940 MRN [id number]   [facility] 2NE-A Attending Rachid Fine MD   Hosp Day # 0 PCP Chelita Reyna MD     Reason for Consultation:   jhyp[oxi Right  ROTATOR CUFF REPAIR: OTHER Right  AAA STENT: OTHER  PROSTATE LASER SURGERY: OTHER  No date: SPINE SURGERY PROCEDURE UNLISTED      Comment: CERVICAL FUSION AND LUMBAR LAMINECTOMY  Family History   Problem Relation Age of Onset   • Hypertension Father 03/14/18 1450 101/72 - - 89 18 96 % - -   03/14/18 1350 108/74 - - 99 18 93 % - -   03/14/18 1315 129/60 99.2 °F (37.3 °C) Temporal 99 23 (!) 72 % 6' (1.829 m) 212 lb (96.2 kg)         Wt Readings from Last 6 Encounters:  03/14/18 : 212 lb (96.2 kg)  09/ sildenafil  No need tyo follow lactate ( related to hypoxia , not sepsis  Prn xanax  Postpone HRCT now until fluid status is more clear   Thank you for including us in the care of this patient. We will follow with you.      Nathalia Booker  3/14/2018  5

## 2018-03-14 NOTE — H&P
[facility]  History and Physical     Duane Ogle Patient Status:  Inpatient    1940 MRN [id number]   [facility] 2NE-A Attending Vera Judd MD   Hosp Day # 0 PCP Frankie Meng MD     Chief Complaint: [facility] Family History   Problem Relation Age of Onset   • Hypertension Father    • Cancer Mother    • Cancer Sister        Allergies:   Darvon [Propoxyphen*      Penicillins                 Comment:UNSURE OF REACTION  Red Wine Extract            Medications: °C) (Oral)   Resp 18   Ht 6' (1.829 m)   Wt 212 lb (96.2 kg)   SpO2 99%   BMI 28.75 kg/m²   General: No acute distress. Alert and oriented x 3. HEENT: Normocephalic atraumatic. Moist mucous membranes. EOM-I. PERRLA. Anicteric. Neck: No lymphadenopathy.  Allegra Baar discussed with patient, er     Lisa Lindsey MD  8/96/6493          **Certification      PHYSICIAN Certification of Need for Inpatient Hospitalization - Initial Certification    Patient will require inpatient services that will reasonably be expected to

## 2018-03-14 NOTE — ED PROVIDER NOTES
Patient Seen in: [facility] Emergency Department    History   Patient presents with:  Dyspnea DEJAH SOB (respiratory)    Stated Complaint: Shortness of breath    HPI    52-year-old male, extensive medical history notable for coronary artery disease, p Packs/day: 2.00      Years: 0.00         Quit date: 1/26/2001  Smokeless tobacco: Never Used                      Alcohol use: Yes           0.0 oz/week     Comment: Occasional once monthly      Review of Syst LACTIC ACID, PLASMA - Abnormal; Notable for the following:     Lactic Acid 2.5 (*)     All other components within normal limits   PRO BETA NATRIURETIC PEPTIDE - Abnormal; Notable for the following:     Pro-Beta Natriuretic Peptide 2,418 (*)     All othe maintain his sats which is above his baseline. Per notes he should be on 8 L at rest and 10 L with exertion. Admission disposition: 3/14/2018  3:27 PM       Patient's been admitted primarily to the [facility].   Also consulted Dr. Whit schroeder

## 2018-03-15 ENCOUNTER — PRIOR ORIGINAL RECORDS (OUTPATIENT)
Dept: OTHER | Age: 78
End: 2018-03-15

## 2018-03-15 ENCOUNTER — APPOINTMENT (OUTPATIENT)
Dept: ULTRASOUND IMAGING | Facility: HOSPITAL | Age: 78
DRG: 197 | End: 2018-03-15
Attending: INTERNAL MEDICINE
Payer: MEDICARE

## 2018-03-15 LAB
ALBUMIN SERPL-MCNC: 3.1 G/DL (ref 3.5–4.8)
ALLENS TEST: POSITIVE
ALP LIVER SERPL-CCNC: 58 U/L (ref 45–117)
ALT SERPL-CCNC: 18 U/L (ref 17–63)
ARTERIAL BLD GAS O2 SATURATION: 88 % (ref 92–100)
ARTERIAL BLD GAS O2 SATURATION: 97 % (ref 92–100)
ARTERIAL BLD GAS O2 SATURATION: 98 % (ref 92–100)
ARTERIAL BLOOD GAS BASE EXCESS: -0.6
ARTERIAL BLOOD GAS BASE EXCESS: -0.9
ARTERIAL BLOOD GAS BASE EXCESS: 0.1
ARTERIAL BLOOD GAS HCO3: 22.5 MEQ/L (ref 22–26)
ARTERIAL BLOOD GAS HCO3: 23.5 MEQ/L (ref 22–26)
ARTERIAL BLOOD GAS HCO3: 24.7 MEQ/L (ref 22–26)
ARTERIAL BLOOD GAS PCO2: 33 MM HG (ref 35–45)
ARTERIAL BLOOD GAS PCO2: 36 MM HG (ref 35–45)
ARTERIAL BLOOD GAS PCO2: 39 MM HG (ref 35–45)
ARTERIAL BLOOD GAS PH: 7.42 (ref 7.35–7.45)
ARTERIAL BLOOD GAS PH: 7.43 (ref 7.35–7.45)
ARTERIAL BLOOD GAS PH: 7.45 (ref 7.35–7.45)
ARTERIAL BLOOD GAS PO2: 114 MM HG (ref 80–105)
ARTERIAL BLOOD GAS PO2: 465 MM HG (ref 80–105)
ARTERIAL BLOOD GAS PO2: 54 MM HG (ref 80–105)
AST SERPL-CCNC: 11 U/L (ref 15–41)
BETA NATRIURETIC PEPTIDE: 506 PG/ML (ref 2–99)
BILIRUB SERPL-MCNC: 0.4 MG/DL (ref 0.1–2)
BUN BLD-MCNC: 24 MG/DL (ref 8–20)
CALCIUM BLD-MCNC: 8.7 MG/DL (ref 8.3–10.3)
CALCULATED O2 SATURATION: 100 % (ref 92–100)
CALCULATED O2 SATURATION: 90 % (ref 92–100)
CALCULATED O2 SATURATION: 99 % (ref 92–100)
CARBOXYHEMOGLOBIN: 1.6 % SAT (ref 0–3)
CARBOXYHEMOGLOBIN: 1.6 % SAT (ref 0–3)
CARBOXYHEMOGLOBIN: 1.8 % SAT (ref 0–3)
CHLORIDE: 104 MMOL/L (ref 101–111)
CO2: 24 MMOL/L (ref 22–32)
CREAT BLD-MCNC: 1.18 MG/DL (ref 0.7–1.3)
EST. AVERAGE GLUCOSE BLD GHB EST-MCNC: 180 MG/DL (ref 68–126)
FIO2: 100 %
FIO2: 50 %
GLUCOSE BLD-MCNC: 153 MG/DL (ref 65–99)
GLUCOSE BLD-MCNC: 199 MG/DL (ref 65–99)
GLUCOSE BLD-MCNC: 218 MG/DL (ref 65–99)
GLUCOSE BLD-MCNC: 242 MG/DL (ref 70–99)
GLUCOSE BLD-MCNC: 258 MG/DL (ref 65–99)
HAV IGM SER QL: 2 MG/DL (ref 1.7–3)
HBA1C MFR BLD HPLC: 7.9 % (ref ?–5.7)
IONIZED CALCIUM: 1.19 MMOL/L (ref 1.12–1.32)
IONIZED CALCIUM: 1.21 MMOL/L (ref 1.12–1.32)
L/M: 6 L/MIN
LACTIC ACID ARTERIAL: 1.6 MMOL/L (ref 0.5–2)
LACTIC ACID ARTERIAL: 2.1 MMOL/L (ref 0.5–2)
M PROTEIN MFR SERPL ELPH: 7 G/DL (ref 6.1–8.3)
METHEMOGLOBIN: 0.4 % SAT (ref 0.4–1.5)
P/F RATIO: 233.9 MMHG
P/F RATIO: 262.9 MMHG
P/F RATIO: 467.6 MMHG
PATIENT TEMPERATURE: 97.8 F
PEEP: 10 CM H2O
PEEP: 10 CM H2O
PHOSPHATE SERPL-MCNC: 2.7 MG/DL (ref 2.5–4.9)
POTASSIUM BLOOD GAS: 4.5 MMOL/L (ref 3.6–5.1)
POTASSIUM BLOOD GAS: 4.7 MMOL/L (ref 3.6–5.1)
POTASSIUM SERPL-SCNC: 4.5 MMOL/L (ref 3.6–5.1)
SODIUM BLOOD GAS: 137 MMOL/L (ref 136–144)
SODIUM BLOOD GAS: 138 MMOL/L (ref 136–144)
SODIUM SERPL-SCNC: 137 MMOL/L (ref 136–144)
TOTAL HEMOGLOBIN: 12.6 G/DL (ref 12.6–17.4)
TOTAL HEMOGLOBIN: 12.6 G/DL (ref 12.6–17.4)
TOTAL HEMOGLOBIN: 12.9 G/DL (ref 12.6–17.4)

## 2018-03-15 PROCEDURE — 93970 EXTREMITY STUDY: CPT | Performed by: INTERNAL MEDICINE

## 2018-03-15 PROCEDURE — 99232 SBSQ HOSP IP/OBS MODERATE 35: CPT | Performed by: HOSPITALIST

## 2018-03-15 NOTE — PROGRESS NOTES
[facility]  Progress Note     Sebastián Camargo Patient Status:  Inpatient    1940 MRN [id number]   [facility] 2NE-A Attending Dinora Fischer MD   Hosp Day # 1 PCP Colby Taylor MD     Chief Complaint: dyspnea     S: Patient fe [facility]   • insulin detemir  14 Units Subcutaneous Moustapha@[employer]   • Insulin Aspart Pen  1-10 Units Subcutaneous TID AC and HS   • furosemide  40 mg Oral Daily   • Sildenafil Citrate  40 mg Oral TID       ASSESSMENT / PLAN:     1.  Hypoxia - with longstanding h

## 2018-03-15 NOTE — PROGRESS NOTES
[facility]  Advanced Heart Failure Progress Note    Elaina Mishra Patient Status:  Inpatient    1940 MRN [id number]   [facility] 2NE-A Attending Narciso Alba MD   Hosp Day # 1 PCP Conchita Holbrook MD     Subjective:  Very frust and oriented, normal affect. Skin: Warm and dry.      Laboratory/Data:    Labs:       Lab Results  Component Value Date   INR 1.09 03/14/2018   INR 1.00 09/25/2017   INR 1.06 01/04/2017     Recent Labs   Lab  03/15/18   0518   BNPML  506*       BUN (mg/dL) 10 mg 10 mg Rectal Daily PRN   ondansetron HCl (ZOFRAN) injection 4 mg 4 mg Intravenous Q6H PRN   insulin detemir (LEVEMIR) 100 UNIT/ML flextouch 14 Units 14 Units Subcutaneous Eve@[employer]   Insulin Aspart Pen (NOVOLOG) 100 UNIT/ML flexpen 1-10 Units 1-1 [address] 4th [facility]  Chiquis Dowling 12, [address] [facility], [address] [address]  Phone: [phone]  Fax: [phone]  E-mail: [email or web address]    3/15/2018

## 2018-03-15 NOTE — CONSULTS
[facility]  Report of Consultation    Daria Charles Patient Status:  Inpatient    1940 MRN [id number]   [facility] 2NE-A Attending John Boone MD   Hosp Day # 0 PCP Carlos Mcgregor MD     Reason for Consultation:  Respiratory February 2017, right heart cath showed pulmonary hypertension at 48 mmHg, but with normal cardiac output with high PVR of 4.8 Wood units. His pulmonary hypertension was rapidly low due to hypervolemia with his RA of 1 and Wedge of 3.  We asked the patient t date:  CATH PERCUTANEOUS  TRANSLUMINAL CORONARY ANGIO*  9/12/2014: COLONOSCOPY      Comment: Procedure: COLONOSCOPY;  Surgeon: Grace Mckoy MD;  Location: [address] [place] [address] ENDOSCOPY  No date: COLONOSCOPY  No date: LAMINECTOMY,LUMBAR  HAND BONE GROWTH 5000 UNIT/ML injection 5,000 Units, 5,000 Units, Subcutaneous, Q8H [address]  •  acetaminophen (TYLENOL) tab 650 mg, 650 mg, Oral, Q4H PRN **OR** HYDROcodone-acetaminophen (NORCO) 5-325 MG per tab 1 tablet, 1 tablet, Oral, Q4H PRN **OR** HYDROcodone-acetaminophe 3/14/2018  PROCEDURE:  XR CHEST AP PORTABLE  (CPT=71045)  TECHNIQUE:  AP chest radiograph was obtained. COMPARISON:  [facility] XR CHEST AP PORTABLE  (CPT=71010), 9/25/2017, 21:16.   INDICATIONS:  Shortness of breath  PATIENT STATED HISTORY: (As transcribed he is on oxygen 24/7    2. Pulmonary HTN, severe initially vy RHC a year ago, which actually responded to Sildanefil tx and dropped to 34 mmHg by doppler study in fall 2017 and no higher pressures seen by echo doppler last month.  Now may be higher with exa

## 2018-03-15 NOTE — HISTORICAL OFFICE NOTE
Brigette Burns  : 1940  ACCOUNT:  [id number]  [phone]  PCP: Dr. Kasey Jackson     DICTATED BY:  [Dr. Ba Ferrell: [Followup of Dyspnea.]    HPI:    [On 2018, Marvin Araiza, a 69year old male, presented with no interim ca aneurysm, MI and right cardiac catheterization 2017    FAMILY HISTORY: Negative for premature CAD. Negative for AAA. SOCIAL HISTORY: SMOKING: Former tobacco use. quit 2/02/03, smoked 2ppd X 48 yrs. CAFFEINE: 3-4 beverages daily and other none caf. Ann[employer]Marie [employer] medical regimen including diuretics and sildenafil. Would have consideration for palliative care as well. He will be following with Dr. Viet Zhang soon to discuss his pulmonary issues futher.  Will follow in office. ]    ASSESSMENT:  1. . CAD, of native vessels times daily            10/03/11 Cyanocobalamin        1000MCG/  one injection monthly                    08/24/09 Multivitamins                   1 TABLET DAILY.

## 2018-03-15 NOTE — PROGRESS NOTES
[facility]  Progress Note    Daria Charles Patient Status:  Inpatient    1940 MRN [id number]   [facility] 2NE-A Attending John Boone MD   Hosp Day # 1 PCP Carlos Mcgregor MD     Subjective:  Daria Charles is a(n) 69year old neuropathy     Patellofemoral arthritis     Effusion of lower leg joint     Chondromalacia of patella     Pneumonia     Pulmonary fibrosis (HCC)     Respiratory distress     At risk for falling     Hypervolemia     Dyspnea on exertion     Acute on chronic

## 2018-03-15 NOTE — BH PROGRESS NOTE
[facility] Resource Referral Counselor Note    Jud Tam Patient Status:  Inpatient    1940 MRN [id number]   [facility] 2NE-A Attending Tasha Moreland MD   Hosp Day # 1 PCP Blaine MD Allen       S(subjective) The pt

## 2018-03-16 ENCOUNTER — PRIOR ORIGINAL RECORDS (OUTPATIENT)
Dept: OTHER | Age: 78
End: 2018-03-16

## 2018-03-16 ENCOUNTER — HOSPITAL ENCOUNTER (OUTPATIENT)
Dept: CT IMAGING | Facility: HOSPITAL | Age: 78
Discharge: HOME | End: 2018-03-16
Attending: INTERNAL MEDICINE
Payer: MEDICARE

## 2018-03-16 ENCOUNTER — APPOINTMENT (OUTPATIENT)
Dept: CT IMAGING | Facility: HOSPITAL | Age: 78
DRG: 197 | End: 2018-03-16
Attending: HOSPITALIST
Payer: MEDICARE

## 2018-03-16 VITALS
BODY MASS INDEX: 27.92 KG/M2 | RESPIRATION RATE: 20 BRPM | HEART RATE: 68 BPM | OXYGEN SATURATION: 99 % | DIASTOLIC BLOOD PRESSURE: 81 MMHG | TEMPERATURE: 98 F | SYSTOLIC BLOOD PRESSURE: 120 MMHG | WEIGHT: 206.13 LBS | HEIGHT: 72 IN

## 2018-03-16 DIAGNOSIS — J84.10 PULMONARY FIBROSIS (HCC): ICD-10-CM

## 2018-03-16 LAB
ATRIAL RATE: 95 BPM
GLUCOSE BLD-MCNC: 108 MG/DL (ref 65–99)
GLUCOSE BLD-MCNC: 124 MG/DL (ref 65–99)
P AXIS: 27 DEGREES
P-R INTERVAL: 148 MS
Q-T INTERVAL: 372 MS
QRS DURATION: 82 MS
QTC CALCULATION (BEZET): 467 MS
R AXIS: -38 DEGREES
T AXIS: -38 DEGREES
VENTRICULAR RATE: 95 BPM

## 2018-03-16 PROCEDURE — 71250 CT THORAX DX C-: CPT | Performed by: HOSPITALIST

## 2018-03-16 PROCEDURE — 71250 CT THORAX DX C-: CPT | Performed by: INTERNAL MEDICINE

## 2018-03-16 NOTE — CM/SW NOTE
03/16/18 1000   CM/SW Referral Data   Referral Source Physician;Social Work (self-referral)   Reason for Referral Discharge planning   Informant Patient   Patient Info   Patient's Mental Status Alert;Oriented   Patient's [id number] [facility]

## 2018-03-16 NOTE — PROGRESS NOTES
[facility]  Advanced Heart Failure Progress Note    Janice Kevin Patient Status:  Inpatient    1940 MRN [id number]   [facility] 2NE-A Attending Fani Haskins MD   Hosp Day # 2 PCP Jenny Mcdonald MD     Subjective:  Better patricia 03/14/2018 25 (H)   02/27/2018 20   01/30/2012 16   ----------  CREATININE (mg/dL)   Date Value   01/30/2012 1.2   ----------  Creatinine (mg/dL)   Date Value   03/15/2018 1.18   03/14/2018 1.29   02/27/2018 1.21   ----------    Medications:    Current F HS   furosemide (LASIX) tab 40 mg 40 mg Oral Daily   Sildenafil Citrate (REVATIO) tab 40 mg 40 mg Oral TID       Assessment and Plan:  Patient Active Problem List:     Leg pain     Pain in both feet     Peripheral axonal neuropathy     Patellofemoral arthr

## 2018-03-16 NOTE — PLAN OF CARE
D: Patient received orders for discharge,    A: Tried to call to give report at [facility], no one picked up call; reviewed discharge instructions, including appointments to be made; reviewed discharge medications, no changes; PIV removed;

## 2018-03-16 NOTE — PLAN OF CARE
Problem: CARDIOVASCULAR - ADULT  Goal: Maintains optimal cardiac output and hemodynamic stability  INTERVENTIONS:  - Monitor vital signs, rhythm, and trends  - Monitor for bleeding, hypotension and signs of decreased cardiac output  - Evaluate effectivenes hyperglycemia and hypoglycemia  - Administer ordered medications to maintain glucose within target range  - Assess barriers to adequate nutritional intake and initiate nutrition consult as needed  - Instruct patient on self management of diabetes   Outcome Dr Gabino Short MD will enter order to have patient get CT of chest done before discharge. 1225: Spoke to Dr Gabino Short, have not talked to Dr Kourtney Mcdonald but per Dr Gabino Short, patient can be discharged this afternoon.  Patient to be picked up by ambulance at 1330.    123

## 2018-03-16 NOTE — PLAN OF CARE
Talked to Dr Mattie Gramajo and she stated that it was ok for pt to go home and follow up with her in two weeks. Dr Brielle Cortez was notified and he said for pt to follow up with his cardiologist Dr Rhonda SIMMONS updated. Plan of care discussed with pt.

## 2018-03-23 NOTE — DISCHARGE SUMMARY
[facility] [place] HOSPITALIST  DISCHARGE SUMMARY     Pauly Zuñiga Patient Status:  Inpatient    1940 MRN [id number]   [facility] 2NE-A Attending No att. providers found   Hosp Day # 1 PCP Collin De Santiago MD     Date of Admission: 3/14/2018  Date baseline health. Uncertain what caused his dramatic hypoxia. He was monitored and symptoms remained stable. He was eager for discharge and is aware of his prognosis going forward.  Remains in good spirits despite poor prognosis     Procedures during hospita Nintedanib Esylate      Take 150 mg by mouth Q12H. Refills:  0     PLAVIX 75 MG Tabs  Generic drug:  Clopidogrel Bisulfate      Take 75 mg by mouth daily.    Refills:  0     Potassium Chloride ER 10 MEQ Tbcr  Commonly known as:  K-DUR      Take 1 tablet (

## 2018-04-13 ENCOUNTER — PRIOR ORIGINAL RECORDS (OUTPATIENT)
Dept: OTHER | Age: 78
End: 2018-04-13

## 2018-04-13 ENCOUNTER — MYAURORA ACCOUNT LINK (OUTPATIENT)
Dept: OTHER | Age: 78
End: 2018-04-13

## 2018-04-23 ENCOUNTER — PRIOR ORIGINAL RECORDS (OUTPATIENT)
Dept: OTHER | Age: 78
End: 2018-04-23

## 2018-04-23 ENCOUNTER — MYAURORA ACCOUNT LINK (OUTPATIENT)
Dept: OTHER | Age: 78
End: 2018-04-23

## 2018-04-26 ENCOUNTER — PRIOR ORIGINAL RECORDS (OUTPATIENT)
Dept: OTHER | Age: 78
End: 2018-04-26

## 2018-04-26 ENCOUNTER — MYAURORA ACCOUNT LINK (OUTPATIENT)
Dept: OTHER | Age: 78
End: 2018-04-26

## 2018-05-07 LAB
ALBUMIN: 3.1 G/DL
ALBUMIN: 3.3 G/DL
ALKALINE PHOSPHATATE(ALK PHOS): 55 IU/L
ALKALINE PHOSPHATATE(ALK PHOS): 58 IU/L
BILIRUBIN TOTAL: 0.4 MG/DL
BILIRUBIN TOTAL: 0.4 MG/DL
BNP: 506 PMOL/L
BUN: 24 MG/DL
BUN: 25 MG/DL
CALCIUM: 8.7 MG/DL
CALCIUM: 9.1 MG/DL
CHLORIDE: 104 MEQ/L
CHLORIDE: 106 MEQ/L
CREATININE, SERUM: 1.18 MG/DL
CREATININE, SERUM: 1.29 MG/DL
GLUCOSE: 128 MG/DL
GLUCOSE: 242 MG/DL
HEMATOCRIT: 41.4 %
HEMOGLOBIN A1C: 7.9 %
HEMOGLOBIN: 12.9 G/DL
MAGNESIUM: 2 MG/DL
PLATELETS: 184 K/UL
POTASSIUM, SERUM: 4.5 MEQ/L
POTASSIUM, SERUM: 4.6 MEQ/L
PROBNP: 2418 PG/ML
PROTEIN, TOTAL: 7 G/DL
PROTEIN, TOTAL: 7.1 G/DL
RED BLOOD COUNT: 5.6 X 10-6/U
SGOT (AST): 11 IU/L
SGOT (AST): 17 IU/L
SGPT (ALT): 18 IU/L
SGPT (ALT): 20 IU/L
SODIUM: 137 MEQ/L
SODIUM: 139 MEQ/L
WHITE BLOOD COUNT: 13.8 X 10-3/U

## 2018-06-05 ENCOUNTER — LAB ENCOUNTER (OUTPATIENT)
Dept: LAB | Age: 78
End: 2018-06-05
Attending: INTERNAL MEDICINE
Payer: MEDICARE

## 2018-06-05 DIAGNOSIS — J84.10 PULMONARY FIBROSIS (HCC): Primary | ICD-10-CM

## 2018-06-05 PROCEDURE — 36415 COLL VENOUS BLD VENIPUNCTURE: CPT

## 2018-06-05 PROCEDURE — 80053 COMPREHEN METABOLIC PANEL: CPT

## 2018-06-05 PROCEDURE — 85025 COMPLETE CBC W/AUTO DIFF WBC: CPT

## 2018-06-12 ENCOUNTER — PRIOR ORIGINAL RECORDS (OUTPATIENT)
Dept: OTHER | Age: 78
End: 2018-06-12

## 2018-06-27 ENCOUNTER — HOSPITAL ENCOUNTER (INPATIENT)
Facility: HOSPITAL | Age: 78
LOS: 5 days | Discharge: NURSING, REHAB OR LONG-TERM CARE | DRG: 291 | End: 2018-07-02
Attending: EMERGENCY MEDICINE | Admitting: HOSPITALIST
Payer: MEDICARE

## 2018-06-27 ENCOUNTER — APPOINTMENT (OUTPATIENT)
Dept: GENERAL RADIOLOGY | Facility: HOSPITAL | Age: 78
DRG: 291 | End: 2018-06-27
Payer: MEDICARE

## 2018-06-27 DIAGNOSIS — R77.8 TROPONIN I ABOVE REFERENCE RANGE: ICD-10-CM

## 2018-06-27 DIAGNOSIS — I50.9 ACUTE CONGESTIVE HEART FAILURE, UNSPECIFIED HEART FAILURE TYPE (HCC): ICD-10-CM

## 2018-06-27 DIAGNOSIS — R09.02 HYPOXIA: Primary | ICD-10-CM

## 2018-06-27 DIAGNOSIS — J84.10 PULMONARY FIBROSIS (HCC): ICD-10-CM

## 2018-06-27 DIAGNOSIS — I50.32 CHRONIC DIASTOLIC HEART FAILURE (HCC): ICD-10-CM

## 2018-06-27 DIAGNOSIS — I27.20 PULMONARY HYPERTENSION (HCC): ICD-10-CM

## 2018-06-27 LAB
ALBUMIN SERPL-MCNC: 2.9 G/DL (ref 3.5–4.8)
ALLENS TEST: POSITIVE
ALP LIVER SERPL-CCNC: 61 U/L (ref 45–117)
ALT SERPL-CCNC: 24 U/L (ref 17–63)
ARTERIAL BLD GAS O2 SATURATION: 95 % (ref 92–100)
ARTERIAL BLOOD GAS BASE EXCESS: -0.3
ARTERIAL BLOOD GAS HCO3: 23.3 MEQ/L (ref 22–26)
ARTERIAL BLOOD GAS PCO2: 34 MM HG (ref 35–45)
ARTERIAL BLOOD GAS PH: 7.45 (ref 7.35–7.45)
ARTERIAL BLOOD GAS PO2: 83 MM HG (ref 80–105)
AST SERPL-CCNC: 17 U/L (ref 15–41)
ATRIAL RATE: 97 BPM
BASOPHILS # BLD AUTO: 0.03 X10(3) UL (ref 0–0.1)
BASOPHILS NFR BLD AUTO: 0.5 %
BILIRUB SERPL-MCNC: 0.4 MG/DL (ref 0.1–2)
BUN BLD-MCNC: 20 MG/DL (ref 8–20)
CALCIUM BLD-MCNC: 8.5 MG/DL (ref 8.3–10.3)
CALCULATED O2 SATURATION: 97 % (ref 92–100)
CARBOXYHEMOGLOBIN: 1.7 % SAT (ref 0–3)
CHLORIDE: 109 MMOL/L (ref 101–111)
CO2: 24 MMOL/L (ref 22–32)
CREAT BLD-MCNC: 1.39 MG/DL (ref 0.7–1.3)
EOSINOPHIL # BLD AUTO: 0.19 X10(3) UL (ref 0–0.3)
EOSINOPHIL NFR BLD AUTO: 3 %
ERYTHROCYTE [DISTWIDTH] IN BLOOD BY AUTOMATED COUNT: 17.1 % (ref 11.5–16)
GLUCOSE BLD-MCNC: 119 MG/DL (ref 70–99)
GLUCOSE BLD-MCNC: 151 MG/DL (ref 65–99)
HCT VFR BLD AUTO: 36.3 % (ref 37–53)
HGB BLD-MCNC: 11.1 G/DL (ref 13–17)
IMMATURE GRANULOCYTE COUNT: 0.03 X10(3) UL (ref 0–1)
IMMATURE GRANULOCYTE RATIO %: 0.5 %
IONIZED CALCIUM: 1.15 MMOL/L (ref 1.12–1.32)
L/M: 15 L/MIN
LACTIC ACID ARTERIAL: 1.7 MMOL/L (ref 0.5–2)
LYMPHOCYTES # BLD AUTO: 1.1 X10(3) UL (ref 0.9–4)
LYMPHOCYTES NFR BLD AUTO: 17.6 %
M PROTEIN MFR SERPL ELPH: 6.5 G/DL (ref 6.1–8.3)
MCH RBC QN AUTO: 22.9 PG (ref 27–33.2)
MCHC RBC AUTO-ENTMCNC: 30.6 G/DL (ref 31–37)
MCV RBC AUTO: 74.8 FL (ref 80–99)
METHEMOGLOBIN: 0.5 % SAT (ref 0.4–1.5)
MONOCYTES # BLD AUTO: 0.65 X10(3) UL (ref 0.1–1)
MONOCYTES NFR BLD AUTO: 10.4 %
NEUTROPHIL ABS PRELIM: 4.25 X10 (3) UL (ref 1.3–6.7)
NEUTROPHILS # BLD AUTO: 4.25 X10(3) UL (ref 1.3–6.7)
NEUTROPHILS NFR BLD AUTO: 68 %
P AXIS: 44 DEGREES
P-R INTERVAL: 154 MS
P/F RATIO: 105.8 MMHG
PATIENT TEMPERATURE: 98.1 F
PLATELET # BLD AUTO: 196 10(3)UL (ref 150–450)
POTASSIUM BLOOD GAS: 4 MMOL/L (ref 3.6–5.1)
POTASSIUM SERPL-SCNC: 4.4 MMOL/L (ref 3.6–5.1)
PRO-BETA NATRIURETIC PEPTIDE: 8203 PG/ML (ref ?–450)
PROCALCITONIN SERPL-MCNC: <0.11 NG/ML
Q-T INTERVAL: 376 MS
QRS DURATION: 84 MS
QTC CALCULATION (BEZET): 477 MS
R AXIS: -58 DEGREES
RBC # BLD AUTO: 4.85 X10(6)UL (ref 3.8–5.8)
RED CELL DISTRIBUTION WIDTH-SD: 45.5 FL (ref 35.1–46.3)
SODIUM BLOOD GAS: 138 MMOL/L (ref 136–144)
SODIUM SERPL-SCNC: 140 MMOL/L (ref 136–144)
T AXIS: -46 DEGREES
TOTAL HEMOGLOBIN: 11.8 G/DL (ref 12.6–17.4)
TROPONIN: 0.05 NG/ML (ref ?–0.05)
VENTRICULAR RATE: 97 BPM
WBC # BLD AUTO: 6.3 X10(3) UL (ref 4–13)

## 2018-06-27 PROCEDURE — 99223 1ST HOSP IP/OBS HIGH 75: CPT | Performed by: HOSPITALIST

## 2018-06-27 PROCEDURE — 71045 X-RAY EXAM CHEST 1 VIEW: CPT

## 2018-06-27 RX ORDER — POTASSIUM CHLORIDE 750 MG/1
10 TABLET, EXTENDED RELEASE ORAL EVERY OTHER DAY
Status: DISCONTINUED | OUTPATIENT
Start: 2018-06-28 | End: 2018-07-02

## 2018-06-27 RX ORDER — DEXTROSE MONOHYDRATE 25 G/50ML
50 INJECTION, SOLUTION INTRAVENOUS
Status: DISCONTINUED | OUTPATIENT
Start: 2018-06-27 | End: 2018-07-02

## 2018-06-27 RX ORDER — ATENOLOL 25 MG/1
12.5 TABLET ORAL
Status: DISCONTINUED | OUTPATIENT
Start: 2018-06-28 | End: 2018-07-02

## 2018-06-27 RX ORDER — PRAVASTATIN SODIUM 10 MG
10 TABLET ORAL NIGHTLY
Status: DISCONTINUED | OUTPATIENT
Start: 2018-06-27 | End: 2018-07-02

## 2018-06-27 RX ORDER — FUROSEMIDE 10 MG/ML
40 INJECTION INTRAMUSCULAR; INTRAVENOUS 3 TIMES DAILY
Status: DISCONTINUED | OUTPATIENT
Start: 2018-06-27 | End: 2018-06-28

## 2018-06-27 RX ORDER — SILDENAFIL CITRATE 20 MG/1
40 TABLET ORAL 2 TIMES DAILY
Status: DISCONTINUED | OUTPATIENT
Start: 2018-06-27 | End: 2018-07-02

## 2018-06-27 RX ORDER — ACETAMINOPHEN 325 MG/1
650 TABLET ORAL EVERY 6 HOURS PRN
Status: DISCONTINUED | OUTPATIENT
Start: 2018-06-27 | End: 2018-07-02

## 2018-06-27 RX ORDER — METHYLPREDNISOLONE SODIUM SUCCINATE 125 MG/2ML
80 INJECTION, POWDER, LYOPHILIZED, FOR SOLUTION INTRAMUSCULAR; INTRAVENOUS ONCE
Status: COMPLETED | OUTPATIENT
Start: 2018-06-27 | End: 2018-06-27

## 2018-06-27 RX ORDER — CLOPIDOGREL BISULFATE 75 MG/1
75 TABLET ORAL DAILY
Status: DISCONTINUED | OUTPATIENT
Start: 2018-06-27 | End: 2018-07-02

## 2018-06-27 RX ORDER — GABAPENTIN 300 MG/1
300 CAPSULE ORAL 3 TIMES DAILY
Status: DISCONTINUED | OUTPATIENT
Start: 2018-06-27 | End: 2018-07-02

## 2018-06-27 RX ORDER — FUROSEMIDE 10 MG/ML
40 INJECTION INTRAMUSCULAR; INTRAVENOUS ONCE
Status: COMPLETED | OUTPATIENT
Start: 2018-06-27 | End: 2018-06-27

## 2018-06-27 RX ORDER — ALPRAZOLAM 0.25 MG/1
0.25 TABLET ORAL 3 TIMES DAILY PRN
Status: DISCONTINUED | OUTPATIENT
Start: 2018-06-27 | End: 2018-07-02

## 2018-06-27 RX ORDER — ENOXAPARIN SODIUM 100 MG/ML
40 INJECTION SUBCUTANEOUS DAILY
Status: DISCONTINUED | OUTPATIENT
Start: 2018-06-27 | End: 2018-07-02

## 2018-06-27 NOTE — ED PROVIDER NOTES
Patient Seen in: [facility] Emergency Department    History   Patient presents with:  Dyspnea SACHA SOB (respiratory)    Stated Complaint: sacha, low o2 sats     HPI    Betsy Gomez is a 72-year-old male who presents today for evaluation of worsening shortness TRANSLUMINAL CORONARY ANGIO*  9/12/2014: COLONOSCOPY      Comment: Procedure: COLONOSCOPY;  Surgeon: Ana Keating MD;  Location: [facility] ENDOSCOPY  No date: COLONOSCOPY  No date: LAMINECTOMY,LUMBAR  HAND BONE GROWTH REMOVED: OTHER Right  ROT Course     Labs Reviewed   COMP METABOLIC PANEL (14) - Abnormal; Notable for the following:        Result Value    Glucose 119 (*)     Creatinine 1.39 (*)     GFR, Non- 49 (*)     GFR, -American 57 (*)     Albumin 2.9 (*)     All oth COMPARISON:  [facility] , CT CHEST HI RESOLUTION (CPT=71250), 3/16/2018, 11:56. [facility] , XR CHEST AP PORTABLE  (CPT=71045), 3/14/2018, 13:53. INDICATIONS:  sacha, low o2 sats  PATIENT STATED HISTORY: (As transcribed by Technologist)  Difficulty breathing.     Caryl Comes

## 2018-06-27 NOTE — ED INITIAL ASSESSMENT (HPI)
Patient has a history of pulmonary fibrosis and developed low 02 sats and increasing DEJAH over the last few days.

## 2018-06-28 LAB
BUN BLD-MCNC: 24 MG/DL (ref 8–20)
CALCIUM BLD-MCNC: 9.1 MG/DL (ref 8.3–10.3)
CHLORIDE: 104 MMOL/L (ref 101–111)
CO2: 25 MMOL/L (ref 22–32)
CREAT BLD-MCNC: 1.48 MG/DL (ref 0.7–1.3)
EST. AVERAGE GLUCOSE BLD GHB EST-MCNC: 180 MG/DL (ref 68–126)
GLUCOSE BLD-MCNC: 144 MG/DL (ref 65–99)
GLUCOSE BLD-MCNC: 148 MG/DL (ref 65–99)
GLUCOSE BLD-MCNC: 244 MG/DL (ref 65–99)
GLUCOSE BLD-MCNC: 298 MG/DL (ref 70–99)
GLUCOSE BLD-MCNC: 325 MG/DL (ref 65–99)
HBA1C MFR BLD HPLC: 7.9 % (ref ?–5.7)
POTASSIUM SERPL-SCNC: 4.5 MMOL/L (ref 3.6–5.1)
SODIUM SERPL-SCNC: 139 MMOL/L (ref 136–144)
TROPONIN: <0.046 NG/ML (ref ?–0.05)

## 2018-06-28 PROCEDURE — 99233 SBSQ HOSP IP/OBS HIGH 50: CPT | Performed by: HOSPITALIST

## 2018-06-28 RX ORDER — TORSEMIDE 20 MG/1
20 TABLET ORAL
Status: DISCONTINUED | OUTPATIENT
Start: 2018-06-29 | End: 2018-06-29

## 2018-06-28 RX ORDER — ECHINACEA PURPUREA EXTRACT 125 MG
1 TABLET ORAL EVERY 2 HOUR PRN
Status: DISCONTINUED | OUTPATIENT
Start: 2018-06-28 | End: 2018-07-02

## 2018-06-28 NOTE — PROGRESS NOTES
Notified Dr. Anthony Ates of post void residual of 841 after 300 ml voided. Will straight cath and reassess out put.

## 2018-06-28 NOTE — PLAN OF CARE
Problem: Patient/Family Goals  Goal: Patient/Family Long Term Goal  Patient's Long Term Goal: Spend time with his grandson    Interventions  :Monitor SPO2  -Continue breathing treatments  - Oxygen   - Medications as ordered    - See additional Care Plan go

## 2018-06-28 NOTE — PROGRESS NOTES
[facility]  Progress Note     Panchito Lowry Patient Status:  Inpatient    1940 MRN [id number]   [facility] 8NE-A Attending Desirae Armendariz MD   Hosp Day # 1 PCP Cally Maxwell DO     Chief Complaint: SOB    S: Patient states breat • Pravastatin Sodium  10 mg Oral Nightly   • Sildenafil Citrate  40 mg Oral BID   • enoxaparin  40 mg Subcutaneous Daily   • furosemide  40 mg Intravenous TID   • Insulin Aspart Pen  1-10 Units Subcutaneous TID AC and HS   • Nintedanib Esylate  150 mg Or

## 2018-06-28 NOTE — CONSULTS
[facility]  Report of Consultation    Kye Palma Patient Status:  Inpatient    1940 MRN [id number]   [facility] 8NE-A Attending Barrington Patel MD   Hosp Day # 0 PCP Leesa Sawyer DO     Reason for Consultation:  Edema    Histor Problem Relation Age of Onset   • Hypertension Father    • Stroke Father    • Other [OTHER] Father    • Cancer Mother    • Cancer Sister       reports that he quit smoking about 17 years ago. He smoked 2.00 packs per day.  He has never used smokeless toba negative except as described above in HPI. Physical Exam:  Blood pressure 118/68, pulse 108, temperature 97.6 °F (36.4 °C), temperature source Oral, resp. rate 22, height 6' (1.829 m), weight 202 lb 1.6 oz (91.7 kg), SpO2 92 %.   Temp (24hrs), Av.9 ° insulin (HCC)     Pulmonary HTN (HCC)     Syncope     CAD in native artery     Benign essential HTN     Nasal septal perforation     Nasal vestibulitis     Hypoxia     Pulmonary hypertension (HCC)     Acute congestive heart failure, unspecified heart failu

## 2018-06-28 NOTE — ED PROVIDER NOTES
69-year-old male presents to the emergency department with increasing shortness of breath. He has a known history of pulmonary fibrosis. He is chronically on oxygen. He is seen by me as well as by the physician assistant.   He has been gradually increasi

## 2018-06-28 NOTE — ED NOTES
Pt is sitting on cart. He denies any dyspnea, chest pain, or overall discomfort. Will continue to monitor pt.

## 2018-06-28 NOTE — PROGRESS NOTES
[facility]  Cardiology Progress Note    Subjective:  Tells me he had an episode at [facility] where he became \"tangled\" in his blankets and oxygen tubing and could not get free.   Upon trying, he said he became extremely weak and it became difficult transplant candidate as previously outlined by pulmonary. Follows w/ Dr. Rubens Christopher as outpatient. Reviewed today w/ Dr. Harpal Workman at chart. Appears to be a primary respiratory event with associated volume excess.   Oxygen requirements up to 10L/nc at baseline ( Rafaela Lpoez and Dr. Loren Gloria (PSVT). Pulmonary note reviewed (unclear etiology of symptoms). Acute of chronic flare up of interstitial lung disease with mild fluid overload component. Proved by RHCs tendency to hypovolemia but mild fluid overload this time.  Few mon

## 2018-06-28 NOTE — H&P
[facility]  History and Physical     Kirill Patel Patient Status:  Emergency    1940 MRN [id number]   Location [address] [facility] Attending Rocco Lang MD   Hosp Day # 0 PCP Yumiko Ghotra DO     Chief Complaint: SOB LAMINECTOMY,LUMBAR  HAND BONE GROWTH REMOVED: OTHER Right  ROTATOR CUFF REPAIR: OTHER Right  AAA STENT: OTHER  PROSTATE LASER SURGERY: OTHER  No date: SPINE SURGERY PROCEDURE UNLISTED      Comment: CERVICAL FUSION AND LUMBAR LAMINECTOMY  Social History:  r Rfl:    Alendronate Sodium (FOSAMAX) 70 MG Oral Tab Take 70 mg by mouth once a week. Take with full glass of water. Remain upright for 30 min. May eat after 30 min. Takes on Sundays.  Disp:  Rfl:    cyanocobalamin (VITAMIN B12) 1000 MCG/ML Injection Solut CO2  24.0   ALKPHO  61   AST  17   ALT  24   BILT  0.4   TP  6.5     No results for input(s): PTP, INR in the last 168 hours. Recent Labs   Lab  06/27/18   1549   TROP  0.046*     Imaging: Imaging data reviewed in Epic. ASSESSMENT / PLAN:   1.  Acute on

## 2018-06-28 NOTE — HISTORICAL OFFICE NOTE
Suyapa Khushisandrita  : 1940  ACCOUNT:  [id number]  [phone]  PCP: Dr. Agustin Breaux     TODAY'S DATE: 2018  DICTATED BY:  [Dr. Fabian Galarza      CHIEF COMPLAINT: [Followup of Pulm HTN, other secondary.]    HPI:    [On 2018, Angeli Juarez 2017    FAMILY HISTORY: Negative for premature CAD. Negative for AAA. SOCIAL HISTORY: SMOKING: Former tobacco use. quit 2/02/03, smoked 2ppd X 48 yrs. CAFFEINE: 3-4 beverages daily and other none caf. . ALCOHOL: denies drinking.  EXERCISE: pulmonary rehab 2 Because I have no additional options for the patient I encouraged him to continue to see his regular cardiologist and Dr. Tripp Cortez.       PRESCRIPTIONS:   03/12/18 *ALPRAZolam           0.25MG    1 TABLET  AS NEEDED.                     01/12/18 *Furosemide

## 2018-06-28 NOTE — PROGRESS NOTES
Heart failure follow up appointment made for 07-18-18 @ 2:30 pm with Misael MARTIN  Per Xiomy Rowley ([facility]) no sooner appointments are available at this time.

## 2018-06-28 NOTE — PLAN OF CARE
NURSING ADMISSION NOTE      Patient admitted via CART. Oriented to room. Safety precautions initiated. Bed in low position. Call light in reach. ADMITTED A 69 YRS OLD MALE FROM ER ALERT AND ORIENTED X4 WITH COMPLAINT OF SHORTNESS OF BREATH.  WITH O

## 2018-06-28 NOTE — CONSULTS
[facility]  Report of Consultation    Gricel Hyde Patient Status:  Inpatient    1940 MRN [id number]   [facility] 8NE-A Attending Uvaldo Collins MD   Hosp Day # 1 PCP Vineet Mcdonald DO     Reason for Consultation:  hypoxia    His Mother    • Cancer Sister       reports that he quit smoking about 17 years ago. He smoked 2.00 packs per day.  He has never used smokeless tobacco.    Allergies:    Darvon [Propoxyphen*      Penicillins                 Comment:UNSURE OF REACTION  Red Wine snoring, sore throat, hoarseness and voice change. Respiratory: Negative for cough, sputum, hemoptysis, chest pain, wheezing, + dyspnea on exertion, no stridor.   Cardiovascular: Negative for chest pain, palpitations, irregular heart beats, syncope, fatigu 1830 128/83 - - 95 19 100 % - -   06/27/18 1815 124/80 - - 92 20 100 % - -   06/27/18 1800 122/68 - - 93 21 100 % - -   06/27/18 1745 118/82 - - 93 25 100 % - -   06/27/18 1730 107/78 - - 93 24 97 % - -   06/27/18 1635 95/77 - - 95 16 100 % - -   06/27/18 much difference from March. No clear signs of effusion of engorged vasculature    Assessment:  · Acute on chronic respiratory failure  · IPF  · PHTN  · HTN  · DM  · GISELLA      Plan:  · Unclear etiology of symptoms.  He has improved subjectively with diuresis,

## 2018-06-29 LAB
ALLENS TEST: POSITIVE
ARTERIAL BLD GAS O2 SATURATION: 94 % (ref 92–100)
ARTERIAL BLOOD GAS BASE EXCESS: -1.7
ARTERIAL BLOOD GAS HCO3: 21.4 MEQ/L (ref 22–26)
ARTERIAL BLOOD GAS PCO2: 31 MM HG (ref 35–45)
ARTERIAL BLOOD GAS PH: 7.46 (ref 7.35–7.45)
ARTERIAL BLOOD GAS PO2: 76 MM HG (ref 80–105)
BUN BLD-MCNC: 36 MG/DL (ref 8–20)
CALCIUM BLD-MCNC: 8.8 MG/DL (ref 8.3–10.3)
CALCULATED O2 SATURATION: 96 % (ref 92–100)
CARBOXYHEMOGLOBIN: 1.6 % SAT (ref 0–3)
CHLORIDE: 100 MMOL/L (ref 101–111)
CO2: 28 MMOL/L (ref 22–32)
CREAT BLD-MCNC: 1.75 MG/DL (ref 0.7–1.3)
GLUCOSE BLD-MCNC: 221 MG/DL (ref 65–99)
GLUCOSE BLD-MCNC: 242 MG/DL (ref 65–99)
GLUCOSE BLD-MCNC: 256 MG/DL (ref 65–99)
GLUCOSE BLD-MCNC: 256 MG/DL (ref 70–99)
GLUCOSE BLD-MCNC: 289 MG/DL (ref 65–99)
GLUCOSE BLD-MCNC: 314 MG/DL (ref 65–99)
GLUCOSE BLD-MCNC: 402 MG/DL (ref 65–99)
L/M: 15 L/MIN
METHEMOGLOBIN: 0.5 % SAT (ref 0.4–1.5)
P/F RATIO: 76.9 MMHG
PATIENT TEMPERATURE: 98.2 F
POTASSIUM SERPL-SCNC: 4.5 MMOL/L (ref 3.6–5.1)
PRO-BETA NATRIURETIC PEPTIDE: 7201 PG/ML (ref ?–450)
SODIUM SERPL-SCNC: 136 MMOL/L (ref 136–144)
TOTAL HEMOGLOBIN: 12.2 G/DL (ref 12.6–17.4)

## 2018-06-29 PROCEDURE — 99233 SBSQ HOSP IP/OBS HIGH 50: CPT | Performed by: HOSPITALIST

## 2018-06-29 RX ORDER — TORSEMIDE 10 MG/1
10 TABLET ORAL DAILY
Qty: 30 TABLET | Refills: 11 | Status: SHIPPED | OUTPATIENT
Start: 2018-06-30 | End: 2018-07-02

## 2018-06-29 RX ORDER — HALOPERIDOL 5 MG/ML
1 INJECTION INTRAMUSCULAR EVERY 6 HOURS PRN
Status: DISCONTINUED | OUTPATIENT
Start: 2018-06-29 | End: 2018-07-02

## 2018-06-29 RX ORDER — TORSEMIDE 5 MG/1
10 TABLET ORAL DAILY
Status: DISCONTINUED | OUTPATIENT
Start: 2018-06-30 | End: 2018-07-01

## 2018-06-29 RX ORDER — TORSEMIDE 20 MG/1
20 TABLET ORAL DAILY
Status: DISCONTINUED | OUTPATIENT
Start: 2018-06-30 | End: 2018-06-29

## 2018-06-29 RX ORDER — PREDNISONE 20 MG/1
40 TABLET ORAL
Status: DISCONTINUED | OUTPATIENT
Start: 2018-06-30 | End: 2018-07-02

## 2018-06-29 NOTE — PROGRESS NOTES
[facility]  Progress Note     Erik Michael Patient Status:  Inpatient    1940 MRN [id number]   [facility] 8NE-A Attending Jesse Scott MD   Hosp Day # 2 PCP Tracey Burgess DO     Chief Complaint: SOB    S:  Pt confused overnig atenolol  12.5 mg Oral Daily Beta Blocker   • Clopidogrel Bisulfate  75 mg Oral Daily   • gabapentin  300 mg Oral TID   • insulin detemir  20 Units Subcutaneous Q12H   • Potassium Chloride ER  10 mEq Oral QOD   • Pravastatin Sodium  10 mg Oral Nightly   •

## 2018-06-29 NOTE — PROGRESS NOTES
0500, patient was calling out \"Hello\" from his room. This RN came in & patient was dangling at end of bed. Disoriented x 4, tearful stating \"I don't remember anything. \" No other focal deficits. VSS. . Dr. Chloe Bravo called and notified.  ABG ordered &

## 2018-06-29 NOTE — HOME CARE LIAISON
Referral received from  Lennox Fordyce. Patient resides at [facility]. He does not need home health. His goal is to get his liter flow to 10L because that will help him scooter to the dining rae for meals.   If he cannot achieve [facility]

## 2018-06-29 NOTE — PROGRESS NOTES
· [facility] Progress Note     Subjective:  Dyspnea improved, edema in legs are at baseline or below    Objective:  108/70  Afebrile  SR brief PAT  I/O - 670   BUN/cr 36/1.75    Cardiac: S1 S2 regular  Lungs: decreased BS  Abdomen: soft  Extre

## 2018-06-29 NOTE — PROGRESS NOTES
[facility] Pulmonary/Critical Care Progress Note     SUBJECTIVE/24H Events: All events, procedures, notes reviewed.  Confused overnight and agitated with nurse this am. Presently he denies any concerns, thinks he is over 1.39*  1.48*  1.75*   GFRAA  57*  52*  43*   GFRNAA  49*  45*  37*   CA  8.5  9.1  8.8   NA  140  139  136   K  4.4  4.5  4.5   CL  109  104  100*   CO2  24.0  25.0  28.0     Recent Labs   Lab  06/27/18   1549   RBC  4.85   HGB  11.1*   HCT  36.3*   MCV  7 HS   • Nintedanib Esylate  150 mg Oral 2 times per day     haloperidol lactate, Saline Nasal Spray, ALPRAZolam, glucose **OR** Glucose-Vitamin C **OR** dextrose **OR** glucose **OR** Glucose-Vitamin C, acetaminophen    ASSESSMENT    · Acute on chronic hypo

## 2018-06-30 LAB
BUN BLD-MCNC: 37 MG/DL (ref 8–20)
CALCIUM BLD-MCNC: 8.9 MG/DL (ref 8.3–10.3)
CHLORIDE: 104 MMOL/L (ref 101–111)
CO2: 27 MMOL/L (ref 22–32)
CREAT BLD-MCNC: 1.31 MG/DL (ref 0.7–1.3)
GLUCOSE BLD-MCNC: 133 MG/DL (ref 65–99)
GLUCOSE BLD-MCNC: 300 MG/DL (ref 65–99)
GLUCOSE BLD-MCNC: 311 MG/DL (ref 65–99)
GLUCOSE BLD-MCNC: 75 MG/DL (ref 70–99)
GLUCOSE BLD-MCNC: 97 MG/DL (ref 65–99)
POTASSIUM SERPL-SCNC: 4.3 MMOL/L (ref 3.6–5.1)
SODIUM SERPL-SCNC: 140 MMOL/L (ref 136–144)

## 2018-06-30 PROCEDURE — 99239 HOSP IP/OBS DSCHRG MGMT >30: CPT | Performed by: HOSPITALIST

## 2018-06-30 NOTE — PROGRESS NOTES
· [facility] Progress Note     Subjective:  Dyspnea improved, edema in legs are at baseline or below    Objective:  Blood pressure 102/64, pulse 74, temperature 97.7 °F (36.5 °C), temperature source Oral, resp.  rate 18, height 182.9 cm (6'), w

## 2018-06-30 NOTE — PROGRESS NOTES
[facility] Pulmonary/Critical Care Progress Note     SUBJECTIVE/24H Events: All events, procedures, notes reviewed. No acute events overnight, he denies acute concerns today. Dyspnea remains improved and near baseline. 1.75*  1.31*   GFRAA  52*  43*  61   GFRNAA  45*  37*  53*   CA  9.1  8.8  8.9   NA  139  136  140   K  4.5  4.5  4.3   CL  104  100*  104   CO2  25.0  28.0  27.0     Recent Labs   Lab  06/27/18   1549   RBC  4.85   HGB  11.1*   HCT  36.3*   MCV  74.8*   M Esylate  150 mg Oral 2 times per day     haloperidol lactate, Saline Nasal Spray, ALPRAZolam, glucose **OR** Glucose-Vitamin C **OR** dextrose **OR** glucose **OR** Glucose-Vitamin C, acetaminophen    ASSESSMENT    · Acute on chronic hypoxic respiratory fa

## 2018-06-30 NOTE — PROGRESS NOTES
[facility]  Progress Note     Fabiola Devine Patient Status:  Inpatient    1940 MRN [id number]   [facility] 8NE-A Attending Carolyn Contreras MD   Hosp Day # 3 PCP Hattie Ruth DO     Chief Complaint: SOB    S:  Pt intermittently c breakfast   • insulin detemir  23 Units Subcutaneous Q12H   • torsemide  10 mg Oral Daily   • atenolol  12.5 mg Oral Daily Beta Blocker   • Clopidogrel Bisulfate  75 mg Oral Daily   • gabapentin  300 mg Oral TID   • Potassium Chloride ER  10 mEq Oral QOD

## 2018-07-01 LAB
GLUCOSE BLD-MCNC: 124 MG/DL (ref 65–99)
GLUCOSE BLD-MCNC: 127 MG/DL (ref 65–99)
GLUCOSE BLD-MCNC: 348 MG/DL (ref 65–99)
GLUCOSE BLD-MCNC: 77 MG/DL (ref 65–99)

## 2018-07-01 PROCEDURE — 99233 SBSQ HOSP IP/OBS HIGH 50: CPT | Performed by: HOSPITALIST

## 2018-07-01 RX ORDER — TORSEMIDE 20 MG/1
20 TABLET ORAL DAILY
Status: DISCONTINUED | OUTPATIENT
Start: 2018-07-02 | End: 2018-07-02

## 2018-07-01 NOTE — PLAN OF CARE
Received report from night RN. Pt sitting up in the bed with out s/s of acute distress. Pt denied pain at that time. Pt requesting to get up and bathe this morning. Pt is alert and oriented  4. Pt continues to receive O2 on hiflo at 12L/min.  Pt c/o O2 myron

## 2018-07-01 NOTE — PROGRESS NOTES
[facility]  Progress Note    Sukhwinder Baker Patient Status:  Inpatient    1940 MRN [id number]   [facility] 8NE-A Attending Lev Hays MD   [address] Day # 4 PCP Gavino Egan DO       Assessment and Plan:  Patient Active Problem List: 24 hour   Intake              610 ml   Output             1250 ml   Net             -640 ml       Wt Readings from Last 3 Encounters:  07/01/18 : 205 lb 7.5 oz (93.2 kg)  03/14/18 : 206 lb 2.1 oz (93.5 kg)  09/26/17 : 207 lb 7.3 oz (94.1 kg)      Allergies 50 % injection 50 mL 50 mL Intravenous Q15 Min PRN   Or      glucose (DEX4) oral liquid 30 g 30 g Oral Q15 Min PRN   Or      Glucose-Vitamin C (DEX-4) 4-0.006 g chewable tab 8 tablet 8 tablet Oral Q15 Min PRN   Enoxaparin Sodium (LOVENOX) 40 MG/0.4ML injec

## 2018-07-01 NOTE — PHYSICAL THERAPY NOTE
PHYSICAL THERAPY EVALUATION - INPATIENT     Room Number: 9583/5011-I  Evaluation Date: 7/1/2018  Type of Evaluation: Initial  Physician Order: PT Eval and Treat    Presenting Problem: increasing SOB  Reason for Therapy: Mobility Dysfunction and Disch TRANSLUMINAL CORONARY ANGIO*  9/12/2014: COLONOSCOPY      Comment: Procedure: COLONOSCOPY;  Surgeon: Sue Blanca MD;  Location: [address] [place] [address] ENDOSCOPY  No date: COLONOSCOPY  No date: LAMINECTOMY,LUMBAR  HAND BONE GROWTH REMOVED: OTHER Right  ROT breath    AM-PAC '6-Clicks' INPATIENT SHORT FORM - BASIC MOBILITY  How much difficulty does the patient currently have. ..  -   Turning over in bed (including adjusting bedclothes, sheets and blankets)?: A Little   -   Sitting down on and standing up from a ambulated approx 40 feet back to room with RW and CGA. Pt returned to room and sat on bedside chair with supervision. Pt educated on ambulating with staff at least 3x/day and to sit up for meals. Pt verbalized understanding and agreement.        Exercise/Ed able to ambulate 50 feet with assist device: walker - rolling at assistance level: supervision     Goal #4    Goal #5    Goal #6    Goal Comments: Goals established on 7/1/2018

## 2018-07-01 NOTE — PLAN OF CARE
Glucose maintained within prescribed range Progressing      Achieve highest/safest level of independence in self care Progressing      Achieve highest/safest level of mobility/gait Progressing      Patient/Family Long Term Goal Progressing      Patient/Fam

## 2018-07-01 NOTE — PROGRESS NOTES
[facility]  Progress Note     Sunnie Denver Patient Status:  Inpatient    1940 MRN [id number]   [facility] 8NE-A Attending Nasrin Everett MD   [facility] Day # 4 PCP Satya Montelongo DO     Chief Complaint: sob    S: Patient c/o being weak breakfast   • insulin detemir  23 Units Subcutaneous Q12H   • torsemide  10 mg Oral Daily   • atenolol  12.5 mg Oral Daily Beta Blocker   • Clopidogrel Bisulfate  75 mg Oral Daily   • gabapentin  300 mg Oral TID   • Potassium Chloride ER  10 mEq Oral QOD

## 2018-07-01 NOTE — PROGRESS NOTES
[facility] Pulmonary/Critical Care Progress Note     SUBJECTIVE/24H Events: All events, procedures, notes reviewed. No acute events overnight, he denies acute concerns today. Dyspnea remains improved and near baseline. 9.1  8.8  8.9   NA  139  136  140   K  4.5  4.5  4.3   CL  104  100*  104   CO2  25.0  28.0  27.0     Recent Labs   Lab  06/27/18   1549   RBC  4.85   HGB  11.1*   HCT  36.3*   MCV  74.8*   MCH  22.9*   MCHC  30.6*   RDW  17.1*   NEPRELIM  4.25   WBC  6.3 chronic renal dysfunction  · HTN  · DM      PLAN  · O2 weaned to baseline of 10L with sats at goal (88-92%)  · Agree with diuresis as per cardiology  · Continue to wean prednisone, will continue to keep pred 40 today  · Continue home nintedanib  · Ambulate

## 2018-07-02 ENCOUNTER — APPOINTMENT (OUTPATIENT)
Dept: GENERAL RADIOLOGY | Facility: HOSPITAL | Age: 78
DRG: 291 | End: 2018-07-02
Attending: HOSPITALIST
Payer: MEDICARE

## 2018-07-02 ENCOUNTER — PRIOR ORIGINAL RECORDS (OUTPATIENT)
Dept: OTHER | Age: 78
End: 2018-07-02

## 2018-07-02 VITALS
RESPIRATION RATE: 18 BRPM | DIASTOLIC BLOOD PRESSURE: 57 MMHG | HEART RATE: 73 BPM | BODY MASS INDEX: 27.86 KG/M2 | WEIGHT: 205.69 LBS | HEIGHT: 72 IN | SYSTOLIC BLOOD PRESSURE: 100 MMHG | TEMPERATURE: 98 F | OXYGEN SATURATION: 97 %

## 2018-07-02 LAB
BUN BLD-MCNC: 35 MG/DL (ref 8–20)
CALCIUM BLD-MCNC: 8.6 MG/DL (ref 8.3–10.3)
CHLORIDE: 106 MMOL/L (ref 101–111)
CO2: 30 MMOL/L (ref 22–32)
CREAT BLD-MCNC: 1.19 MG/DL (ref 0.7–1.3)
ERYTHROCYTE [DISTWIDTH] IN BLOOD BY AUTOMATED COUNT: 17.1 % (ref 11.5–16)
GLUCOSE BLD-MCNC: 101 MG/DL (ref 65–99)
GLUCOSE BLD-MCNC: 122 MG/DL (ref 65–99)
GLUCOSE BLD-MCNC: 99 MG/DL (ref 70–99)
HCT VFR BLD AUTO: 41.3 % (ref 37–53)
HGB BLD-MCNC: 12.3 G/DL (ref 13–17)
MCH RBC QN AUTO: 22.7 PG (ref 27–33.2)
MCHC RBC AUTO-ENTMCNC: 29.8 G/DL (ref 31–37)
MCV RBC AUTO: 76.1 FL (ref 80–99)
PLATELET # BLD AUTO: 192 10(3)UL (ref 150–450)
POTASSIUM SERPL-SCNC: 3.9 MMOL/L (ref 3.6–5.1)
RBC # BLD AUTO: 5.43 X10(6)UL (ref 3.8–5.8)
RED CELL DISTRIBUTION WIDTH-SD: 45.2 FL (ref 35.1–46.3)
SODIUM SERPL-SCNC: 142 MMOL/L (ref 136–144)
WBC # BLD AUTO: 9.3 X10(3) UL (ref 4–13)

## 2018-07-02 PROCEDURE — 71046 X-RAY EXAM CHEST 2 VIEWS: CPT | Performed by: HOSPITALIST

## 2018-07-02 PROCEDURE — 99239 HOSP IP/OBS DSCHRG MGMT >30: CPT | Performed by: HOSPITALIST

## 2018-07-02 RX ORDER — PREDNISONE 10 MG/1
TABLET ORAL
Qty: 20 TABLET | Refills: 0 | Status: ON HOLD | OUTPATIENT
Start: 2018-07-02 | End: 2018-07-31

## 2018-07-02 RX ORDER — TORSEMIDE 10 MG/1
10 TABLET ORAL DAILY
Qty: 30 TABLET | Refills: 11 | Status: ON HOLD | OUTPATIENT
Start: 2018-07-02 | End: 2018-08-07

## 2018-07-02 NOTE — PROGRESS NOTES
[facility]  Progress Note     Kye Palma Patient Status:  Inpatient    1940 MRN [id number]   [facility] 8NE-A Attending Rosalie Poole MD   Hosp Day # 5 PCP Leesa Sawyer DO     Chief Complaint: sob    S: Patient feels better o Daily   • predniSONE  40 mg Oral Daily with breakfast   • insulin detemir  23 Units Subcutaneous Q12H   • atenolol  12.5 mg Oral Daily Beta Blocker   • Clopidogrel Bisulfate  75 mg Oral Daily   • gabapentin  300 mg Oral TID   • Potassium Chloride ER  10 mE

## 2018-07-02 NOTE — PLAN OF CARE
[facility] Pt sitting up in the bed with out s/s of acute distress noted at this time. Pt denies pain at this time. VSS. Call light in reach.will continue to monitor.

## 2018-07-02 NOTE — PLAN OF CARE
Impaired Activities of Daily Living    • Achieve highest/safest level of independence in self care Adequate for Discharge        Impaired Functional Mobility    • Achieve highest/safest level of mobility/gait Adequate for Discharge          Diabetes/Glucos

## 2018-07-02 NOTE — CM/SW NOTE
Spoke with eileen [facility]--will arrange [facility] to pick patient up @ 1400; pcs form completed.   Nurse to call report to Guerrero Richardson @ [phone]

## 2018-07-02 NOTE — CM/SW NOTE
Referral to [facility] for pt sent through [facility]; await response

## 2018-07-02 NOTE — DISCHARGE SUMMARY
[facility]  DISCHARGE SUMMARY     Seema Montez Patient Status:  Inpatient    1940 MRN [id number]   [facility] 8NE-A Attending Francie Beard MD   [facility] Day # 5 PCP Estanislado Eisenmenger, DO     Date of Admission: 2018  Date of Dischar total) by mouth daily. Quantity:  30 tablet  Refills:  11        CONTINUE taking these medications      Instructions Prescription details   alendronate 70 MG Tabs  Commonly known as:  FOSAMAX      Take 70 mg by mouth once a week.  Take with full glass of daily. Refills:  0     Vitamin D3 1000 units Caps      Take 1,000 Units by mouth 2 (two) times daily.    Refills:  0        STOP taking these medications    furosemide 40 MG Tabs  Commonly known as:  LASIX        MetFORMIN HCl 1000 MG Tabs  Commonly known

## 2018-07-02 NOTE — PHYSICAL THERAPY NOTE
Attempted to see Pt this AM - RN aware of attempt. Pt en route to x-ray per RN. Will f/u later today if time permits, after all other patients are attempted per tentative schedule.

## 2018-07-02 NOTE — CM/SW NOTE
Message left for eileen Maldonado [facility] supportive living regarding return; await call back

## 2018-07-02 NOTE — PROGRESS NOTES
[facility]  Progress Note    Sunnie Denver Patient Status:  Inpatient    1940 MRN [id number]   [facility] 8NE-A Attending Nasrin Everett MD   [address] Day # 5 PCP Satya Montelongo DO     ASSESSMENT     · Acute on chronic hypoxic respirat Objective:  Oxygen Therapy  SpO2: 93 %  O2 Device: High flow nasal cannula  O2 Flow Rate (L/min): 10 L/min  Pulse Oximetry Type: Continuous  Oximetry Probe Site Changed: No  Pulse Ox Probe Location: Left hand  Blood pressure 96/63, pulse 80, temperature 09/25/2017   INR 1.06 01/04/2017        No results for input(s): TSH in the last 72 hours.     Recent Labs   Lab  06/29/18   0536   ABGPHT  7.46*   SXRCZX5X  31*   EVDKW2I  76*   ABGHCO3  21.4*   ABGBE  -1.7   TEMP  98.2   MARY  Positive   SITE  Right Radi Jhony  7/2/2018  12:20 PM

## 2018-07-10 ENCOUNTER — PRIOR ORIGINAL RECORDS (OUTPATIENT)
Dept: OTHER | Age: 78
End: 2018-07-10

## 2018-07-12 ENCOUNTER — PRIOR ORIGINAL RECORDS (OUTPATIENT)
Dept: OTHER | Age: 78
End: 2018-07-12

## 2018-07-23 ENCOUNTER — HOSPITAL ENCOUNTER (OUTPATIENT)
Dept: CARDIOLOGY CLINIC | Facility: HOSPITAL | Age: 78
Discharge: HOME | DRG: 291 | End: 2018-07-23
Attending: NURSE PRACTITIONER
Payer: MEDICARE

## 2018-07-23 ENCOUNTER — PRIOR ORIGINAL RECORDS (OUTPATIENT)
Dept: OTHER | Age: 78
End: 2018-07-23

## 2018-07-23 VITALS — SYSTOLIC BLOOD PRESSURE: 122 MMHG | DIASTOLIC BLOOD PRESSURE: 81 MMHG | HEART RATE: 98 BPM | RESPIRATION RATE: 18 BRPM

## 2018-07-23 LAB
ANION GAP SERPL CALC-SCNC: 7 MMOL/L (ref 0–18)
BUN BLD-MCNC: 16 MG/DL (ref 8–20)
BUN/CREAT SERPL: 11.9 (ref 10–20)
CALCIUM BLD-MCNC: 8.9 MG/DL (ref 8.3–10.3)
CHLORIDE SERPL-SCNC: 107 MMOL/L (ref 101–111)
CO2 SERPL-SCNC: 26 MMOL/L (ref 22–32)
CREAT BLD-MCNC: 1.34 MG/DL (ref 0.7–1.3)
GLUCOSE BLD-MCNC: 308 MG/DL (ref 70–99)
OSMOLALITY SERPL CALC.SUM OF ELEC: 303 MOSM/KG (ref 275–295)
POTASSIUM SERPL-SCNC: 4.4 MMOL/L (ref 3.6–5.1)
PRO-BETA NATRIURETIC PEPTIDE: 8069 PG/ML (ref ?–450)
SODIUM SERPL-SCNC: 140 MMOL/L (ref 136–144)

## 2018-07-23 PROCEDURE — 83880 ASSAY OF NATRIURETIC PEPTIDE: CPT | Performed by: NURSE PRACTITIONER

## 2018-07-23 PROCEDURE — 96374 THER/PROPH/DIAG INJ IV PUSH: CPT

## 2018-07-23 PROCEDURE — A4216 STERILE WATER/SALINE, 10 ML: HCPCS | Performed by: NURSE PRACTITIONER

## 2018-07-23 PROCEDURE — 80048 BASIC METABOLIC PNL TOTAL CA: CPT | Performed by: NURSE PRACTITIONER

## 2018-07-23 PROCEDURE — 99213 OFFICE O/P EST LOW 20 MIN: CPT

## 2018-07-23 PROCEDURE — 36415 COLL VENOUS BLD VENIPUNCTURE: CPT

## 2018-07-23 PROCEDURE — 96375 TX/PRO/DX INJ NEW DRUG ADDON: CPT

## 2018-07-23 RX ORDER — BUMETANIDE 0.25 MG/ML
2 INJECTION, SOLUTION INTRAMUSCULAR; INTRAVENOUS ONCE
Status: COMPLETED | OUTPATIENT
Start: 2018-07-23 | End: 2018-07-23

## 2018-07-23 RX ADMIN — BUMETANIDE 2 MG: 0.25 INJECTION, SOLUTION INTRAMUSCULAR; INTRAVENOUS at 15:02:00

## 2018-07-23 NOTE — PROGRESS NOTES
Pt sent from [address] [place] [address] for labs and IV diuretics. Labs ordered and drawn in the [facility]. Reviewed allergies with pt. and updated it in the EMR. IV established in the [facility] per protocol. IV diuril and IV bumex given. Pt. tolerated it well.  IV discontinued; annmarie

## 2018-07-23 NOTE — PROGRESS NOTES
RN visit for IV Bumex and Diuril per Cassandra Ayers and labs. No APN visit.     Dx: Acute on Chronic HF

## 2018-07-24 ENCOUNTER — PRIOR ORIGINAL RECORDS (OUTPATIENT)
Dept: OTHER | Age: 78
End: 2018-07-24

## 2018-07-26 ENCOUNTER — HOSPITAL ENCOUNTER (INPATIENT)
Facility: HOSPITAL | Age: 78
LOS: 12 days | Discharge: HOSPICE | DRG: 291 | End: 2018-08-07
Attending: INTERNAL MEDICINE | Admitting: INTERNAL MEDICINE
Payer: MEDICARE

## 2018-07-26 ENCOUNTER — APPOINTMENT (OUTPATIENT)
Dept: GENERAL RADIOLOGY | Facility: HOSPITAL | Age: 78
DRG: 291 | End: 2018-07-26
Attending: NURSE PRACTITIONER
Payer: MEDICARE

## 2018-07-26 ENCOUNTER — MYAURORA ACCOUNT LINK (OUTPATIENT)
Dept: OTHER | Age: 78
End: 2018-07-26

## 2018-07-26 ENCOUNTER — PRIOR ORIGINAL RECORDS (OUTPATIENT)
Dept: OTHER | Age: 78
End: 2018-07-26

## 2018-07-26 PROBLEM — I50.23 ACUTE ON CHRONIC SYSTOLIC (CONGESTIVE) HEART FAILURE (HCC): Status: ACTIVE | Noted: 2018-07-26

## 2018-07-26 LAB
ALBUMIN SERPL-MCNC: 2.7 G/DL (ref 3.5–4.8)
ALBUMIN/GLOB SERPL: 0.6 {RATIO} (ref 1–2)
ALP LIVER SERPL-CCNC: 97 U/L (ref 45–117)
ALT SERPL-CCNC: 27 U/L (ref 17–63)
ANION GAP SERPL CALC-SCNC: 9 MMOL/L (ref 0–18)
AST SERPL-CCNC: 17 U/L (ref 15–41)
BASOPHILS # BLD AUTO: 0.02 X10(3) UL (ref 0–0.1)
BASOPHILS NFR BLD AUTO: 0.3 %
BILIRUB SERPL-MCNC: 0.3 MG/DL (ref 0.1–2)
BUN BLD-MCNC: 19 MG/DL (ref 8–20)
BUN/CREAT SERPL: 11.8 (ref 10–20)
BUN: 35 MG/DL
CALCIUM BLD-MCNC: 9.1 MG/DL (ref 8.3–10.3)
CALCIUM: 8.6 MG/DL
CHLORIDE SERPL-SCNC: 100 MMOL/L (ref 101–111)
CHLORIDE: 106 MEQ/L
CO2 SERPL-SCNC: 27 MMOL/L (ref 22–32)
CREAT BLD-MCNC: 1.61 MG/DL (ref 0.7–1.3)
CREATININE, SERUM: 1.19 MG/DL
EOSINOPHIL # BLD AUTO: 0.15 X10(3) UL (ref 0–0.3)
EOSINOPHIL NFR BLD AUTO: 2.4 %
ERYTHROCYTE [DISTWIDTH] IN BLOOD BY AUTOMATED COUNT: 19 % (ref 11.5–16)
GLOBULIN PLAS-MCNC: 4.2 G/DL (ref 2.5–3.7)
GLUCOSE BLD-MCNC: 308 MG/DL (ref 65–99)
GLUCOSE BLD-MCNC: 346 MG/DL (ref 65–99)
GLUCOSE BLD-MCNC: 368 MG/DL (ref 70–99)
GLUCOSE BLD-MCNC: 71 MG/DL (ref 65–99)
GLUCOSE BLD-MCNC: 78 MG/DL (ref 65–99)
GLUCOSE: 99 MG/DL
HCT VFR BLD AUTO: 40.7 % (ref 37–53)
HGB BLD-MCNC: 12.3 G/DL (ref 13–17)
IMMATURE GRANULOCYTE COUNT: 0.02 X10(3) UL (ref 0–1)
IMMATURE GRANULOCYTE RATIO %: 0.3 %
LYMPHOCYTES # BLD AUTO: 1.03 X10(3) UL (ref 0.9–4)
LYMPHOCYTES NFR BLD AUTO: 16.3 %
M PROTEIN MFR SERPL ELPH: 6.9 G/DL (ref 6.1–8.3)
MCH RBC QN AUTO: 22.4 PG (ref 27–33.2)
MCHC RBC AUTO-ENTMCNC: 30.2 G/DL (ref 31–37)
MCV RBC AUTO: 74 FL (ref 80–99)
MONOCYTES # BLD AUTO: 0.66 X10(3) UL (ref 0.1–1)
MONOCYTES NFR BLD AUTO: 10.4 %
NEUTROPHIL ABS PRELIM: 4.44 X10 (3) UL (ref 1.3–6.7)
NEUTROPHILS # BLD AUTO: 4.44 X10(3) UL (ref 1.3–6.7)
NEUTROPHILS NFR BLD AUTO: 70.3 %
OSMOLALITY SERPL CALC.SUM OF ELEC: 299 MOSM/KG (ref 275–295)
PLATELET # BLD AUTO: 210 10(3)UL (ref 150–450)
POTASSIUM SERPL-SCNC: 4.5 MMOL/L (ref 3.6–5.1)
POTASSIUM, SERUM: 3.9 MEQ/L
PRO-BETA NATRIURETIC PEPTIDE: 6937 PG/ML (ref ?–450)
RBC # BLD AUTO: 5.5 X10(6)UL (ref 3.8–5.8)
RED CELL DISTRIBUTION WIDTH-SD: 46.7 FL (ref 35.1–46.3)
SODIUM SERPL-SCNC: 136 MMOL/L (ref 136–144)
SODIUM: 142 MEQ/L
WBC # BLD AUTO: 6.3 X10(3) UL (ref 4–13)

## 2018-07-26 PROCEDURE — 99222 1ST HOSP IP/OBS MODERATE 55: CPT | Performed by: HOSPITALIST

## 2018-07-26 PROCEDURE — 71045 X-RAY EXAM CHEST 1 VIEW: CPT | Performed by: NURSE PRACTITIONER

## 2018-07-26 RX ORDER — ECHINACEA PURPUREA EXTRACT 125 MG
1 TABLET ORAL 4 TIMES DAILY PRN
Status: DISCONTINUED | OUTPATIENT
Start: 2018-07-26 | End: 2018-07-27

## 2018-07-26 RX ORDER — MULTIVITAMIN/IRON/FOLIC ACID 18MG-0.4MG
250 TABLET ORAL DAILY PRN
Status: DISCONTINUED | OUTPATIENT
Start: 2018-07-26 | End: 2018-08-07

## 2018-07-26 RX ORDER — TEMAZEPAM 15 MG/1
15 CAPSULE ORAL NIGHTLY PRN
Status: DISCONTINUED | OUTPATIENT
Start: 2018-07-26 | End: 2018-08-02

## 2018-07-26 RX ORDER — CLOPIDOGREL BISULFATE 75 MG/1
75 TABLET ORAL NIGHTLY
Status: DISCONTINUED | OUTPATIENT
Start: 2018-07-26 | End: 2018-08-07

## 2018-07-26 RX ORDER — PRAVASTATIN SODIUM 10 MG
10 TABLET ORAL NIGHTLY
Status: DISCONTINUED | OUTPATIENT
Start: 2018-07-26 | End: 2018-08-02

## 2018-07-26 RX ORDER — ACETAMINOPHEN 325 MG/1
650 TABLET ORAL EVERY 6 HOURS PRN
Status: DISCONTINUED | OUTPATIENT
Start: 2018-07-26 | End: 2018-08-07

## 2018-07-26 RX ORDER — ALPRAZOLAM 0.25 MG/1
0.25 TABLET ORAL 3 TIMES DAILY PRN
Status: DISCONTINUED | OUTPATIENT
Start: 2018-07-26 | End: 2018-08-02

## 2018-07-26 RX ORDER — PREDNISONE 20 MG/1
40 TABLET ORAL DAILY
Status: DISCONTINUED | OUTPATIENT
Start: 2018-07-26 | End: 2018-07-27

## 2018-07-26 RX ORDER — ATENOLOL 25 MG/1
12.5 TABLET ORAL EVERY EVENING
Status: DISCONTINUED | OUTPATIENT
Start: 2018-07-26 | End: 2018-07-27

## 2018-07-26 RX ORDER — GABAPENTIN 300 MG/1
300 CAPSULE ORAL 3 TIMES DAILY
Status: DISCONTINUED | OUTPATIENT
Start: 2018-07-26 | End: 2018-08-05

## 2018-07-26 RX ORDER — POTASSIUM CHLORIDE 750 MG/1
10 TABLET, EXTENDED RELEASE ORAL EVERY OTHER DAY
Status: DISCONTINUED | OUTPATIENT
Start: 2018-07-28 | End: 2018-08-06

## 2018-07-26 RX ORDER — SILDENAFIL CITRATE 20 MG/1
40 TABLET ORAL 3 TIMES DAILY
Status: DISCONTINUED | OUTPATIENT
Start: 2018-07-26 | End: 2018-08-07

## 2018-07-26 RX ORDER — ENOXAPARIN SODIUM 100 MG/ML
40 INJECTION SUBCUTANEOUS DAILY
Status: DISCONTINUED | OUTPATIENT
Start: 2018-07-27 | End: 2018-08-01

## 2018-07-26 RX ORDER — NITROGLYCERIN 0.4 MG/1
0.4 TABLET SUBLINGUAL EVERY 5 MIN PRN
Status: DISCONTINUED | OUTPATIENT
Start: 2018-07-26 | End: 2018-07-26 | Stop reason: SDUPTHER

## 2018-07-26 RX ORDER — NITROGLYCERIN 0.4 MG/1
0.4 TABLET SUBLINGUAL EVERY 5 MIN PRN
Status: DISCONTINUED | OUTPATIENT
Start: 2018-07-26 | End: 2018-07-27

## 2018-07-26 RX ORDER — BUMETANIDE 0.25 MG/ML
2 INJECTION, SOLUTION INTRAMUSCULAR; INTRAVENOUS ONCE
Status: COMPLETED | OUTPATIENT
Start: 2018-07-26 | End: 2018-07-26

## 2018-07-26 RX ORDER — DEXTROSE MONOHYDRATE 25 G/50ML
50 INJECTION, SOLUTION INTRAVENOUS
Status: DISCONTINUED | OUTPATIENT
Start: 2018-07-26 | End: 2018-08-06

## 2018-07-26 NOTE — H&P
Ward Jason  : 1940  ACCOUNT:  [id number]  [phone]  PCP: Dr. Mayi Ramos     TODAY'S DATE: 2018  DICTATED BY:  Drue Kawasaki Frommelt, APN]      CHIEF COMPLAINT: [Followup of Dyspnea, Followup of Edema, localized, Followup of Pulm HTN and othe Polyneuropathy in Diabetes, Ataxia, pulmonary fibrosis, recurrent cellulitis leg, right arm rotator cuff surgery and back surgery    PAST CV HISTORY: 2/02  stent to OM, AAA repair 2004, cardiac catheterization 2003, ct scan AAA 07, infrarenal aneurysm, MI bolus followed by Bumex drip 1 mg/h. If he fails to diurese overnight would consider adding IV Diuril. 2.  Moderate to severe pulmonary arterial hypertension  3. End-stage IPF with associated hypoxic respiratory failure.   Will request pulmonary consult 04/22/13 Clopidogrel Bisulfate 75MG      one tablet daily                         04/22/13 Gabapentin            300MG     one capsule three times daily                  Cherelle Connor M.D., F.A.C.C., F.A.H.A., F.E.S.C.   [facility] [employer]

## 2018-07-27 ENCOUNTER — APPOINTMENT (OUTPATIENT)
Dept: ULTRASOUND IMAGING | Facility: HOSPITAL | Age: 78
DRG: 291 | End: 2018-07-27
Attending: HOSPITALIST
Payer: MEDICARE

## 2018-07-27 ENCOUNTER — APPOINTMENT (OUTPATIENT)
Dept: CV DIAGNOSTICS | Facility: HOSPITAL | Age: 78
DRG: 291 | End: 2018-07-27
Attending: NURSE PRACTITIONER
Payer: MEDICARE

## 2018-07-27 LAB
ANION GAP SERPL CALC-SCNC: 6 MMOL/L (ref 0–18)
ATRIAL RATE: 95 BPM
BUN BLD-MCNC: 17 MG/DL (ref 8–20)
BUN/CREAT SERPL: 11.4 (ref 10–20)
CALCIUM BLD-MCNC: 9.1 MG/DL (ref 8.3–10.3)
CHLORIDE SERPL-SCNC: 100 MMOL/L (ref 101–111)
CO2 SERPL-SCNC: 33 MMOL/L (ref 22–32)
CREAT BLD-MCNC: 1.49 MG/DL (ref 0.7–1.3)
ERYTHROCYTE [DISTWIDTH] IN BLOOD BY AUTOMATED COUNT: 18.8 % (ref 11.5–16)
EST. AVERAGE GLUCOSE BLD GHB EST-MCNC: 266 MG/DL (ref 68–126)
GLUCOSE BLD-MCNC: 108 MG/DL (ref 65–99)
GLUCOSE BLD-MCNC: 121 MG/DL (ref 70–99)
GLUCOSE BLD-MCNC: 125 MG/DL (ref 65–99)
GLUCOSE BLD-MCNC: 213 MG/DL (ref 65–99)
GLUCOSE BLD-MCNC: 222 MG/DL (ref 65–99)
GLUCOSE BLD-MCNC: 68 MG/DL (ref 65–99)
GLUCOSE BLD-MCNC: 83 MG/DL (ref 65–99)
HAV IGM SER QL: 1.9 MG/DL (ref 1.8–2.5)
HBA1C MFR BLD HPLC: 10.9 % (ref ?–5.7)
HCT VFR BLD AUTO: 41.3 % (ref 37–53)
HGB BLD-MCNC: 12.6 G/DL (ref 13–17)
MCH RBC QN AUTO: 22.5 PG (ref 27–33.2)
MCHC RBC AUTO-ENTMCNC: 30.5 G/DL (ref 31–37)
MCV RBC AUTO: 73.9 FL (ref 80–99)
OSMOLALITY SERPL CALC.SUM OF ELEC: 291 MOSM/KG (ref 275–295)
P AXIS: 54 DEGREES
P-R INTERVAL: 162 MS
PLATELET # BLD AUTO: 223 10(3)UL (ref 150–450)
POTASSIUM SERPL-SCNC: 4.2 MMOL/L (ref 3.6–5.1)
PRO-BETA NATRIURETIC PEPTIDE: 9076 PG/ML (ref ?–450)
Q-T INTERVAL: 338 MS
QRS DURATION: 78 MS
QTC CALCULATION (BEZET): 424 MS
R AXIS: -56 DEGREES
RBC # BLD AUTO: 5.59 X10(6)UL (ref 3.8–5.8)
RED CELL DISTRIBUTION WIDTH-SD: 47.1 FL (ref 35.1–46.3)
SODIUM SERPL-SCNC: 139 MMOL/L (ref 136–144)
T AXIS: -19 DEGREES
VENTRICULAR RATE: 95 BPM
WBC # BLD AUTO: 7.6 X10(3) UL (ref 4–13)

## 2018-07-27 PROCEDURE — 93970 EXTREMITY STUDY: CPT | Performed by: HOSPITALIST

## 2018-07-27 PROCEDURE — 93306 TTE W/DOPPLER COMPLETE: CPT | Performed by: NURSE PRACTITIONER

## 2018-07-27 PROCEDURE — 99233 SBSQ HOSP IP/OBS HIGH 50: CPT | Performed by: HOSPITALIST

## 2018-07-27 RX ORDER — DIGOXIN 125 MCG
125 TABLET ORAL DAILY
Status: DISCONTINUED | OUTPATIENT
Start: 2018-07-27 | End: 2018-08-05

## 2018-07-27 RX ORDER — METOPROLOL SUCCINATE 25 MG/1
25 TABLET, EXTENDED RELEASE ORAL NIGHTLY
Status: DISCONTINUED | OUTPATIENT
Start: 2018-07-27 | End: 2018-08-07

## 2018-07-27 RX ORDER — ECHINACEA PURPUREA EXTRACT 125 MG
1 TABLET ORAL
Status: DISCONTINUED | OUTPATIENT
Start: 2018-07-27 | End: 2018-08-07

## 2018-07-27 NOTE — PROGRESS NOTES
· [facility] Progress Note     Subjective:  Comfortable at rest but significant NOVAK. Good UO overnight.   Feet are  Painful    Objective:  103/66  Afebrile  SR rare PVC  I/O - 1905    BUN/cr 17/1.49 proBNP 9017    Neuro: awake/alert  HEENT:  C

## 2018-07-27 NOTE — PROGRESS NOTES
Heart Failure Coordinator Progress Note    Patient was evaluated by the Heart Failure Coordinator for understanding, verbalization, demonstration, and recall of education related to heart failure, overall adherence to the behaviors [facility] scheduled on 8/8/18 at 0900 to see one of the HF APNs in the Clinic. Pt is aware and verbalized understanding. He will be able to drive himself to the follow up. Education provided on enrollment in the [facility] and process/structure of the clinic.  Gave p

## 2018-07-27 NOTE — PLAN OF CARE
Assumed care of pt @ 2300. Pt A&O, denies pain. VSS. On 10L NC, satting >90%. Bumex gtt infusing. NSR on tele. Pt aware of plan.

## 2018-07-27 NOTE — CONSULTS
[facility]  [address] [place] [address] Patient Status:  Inpatient    1940 MRN [id number]   [facility] 2NE-A Attending Chrissy Moses MD   Hosp Day # 0 PCP Estanislado Eisenmenger, DO     Reason for consult: medical mgmt, dm    Request reports that he quit smoking about 17 years ago. He has a 130.00 pack-year smoking history. He has never used smokeless tobacco. He reports that he does not drink alcohol.     Family History:   Family History   Problem Relation Age of Onset   • Hypertension needed. Take 1-2 tablets po as needed for cramps. Disp:  Rfl:    Clopidogrel Bisulfate (PLAVIX) 75 MG Oral Tab Take 75 mg by mouth nightly. Disp:  Rfl:    Atenolol 25 MG Oral Tab Take 12.5 mg by mouth nightly.    Disp:  Rfl:    Cholecalciferol (VITAMIN D 308*  368*   BUN  16  19   CREATSERUM  1.34*  1.61*   GFRAA  59*  47*   GFRNAA  51*  41*   CA  8.9  9.1   ALB   --   2.7*   NA  140  136   K  4.4  4.5   CL  107  100*   CO2  26.0  27.0   ALKPHO   --   97   AST   --   17   ALT   --   27   BILT   --   0.3

## 2018-07-27 NOTE — CONSULTS
[facility]  Report of Consultation    Kb Harmon Patient Status:  Inpatient    1940 MRN [id number]   [facility] 2NE-A Attending Kelle Cohn MD   Hosp Day # 1 PCP Ajay Remy DO     Reason for Consultation:  IPF and pu reports that he quit smoking about 17 years ago. He has a 130.00 pack-year smoking history. He has never used smokeless tobacco. He reports that he does not drink alcohol.     Allergies:    Darvon [Propoxyphen*      Penicillins                 Comment: for cramps. Disp:  Rfl:  Past Week at Unknown time   Clopidogrel Bisulfate (PLAVIX) 75 MG Oral Tab Take 75 mg by mouth nightly. Disp:  Rfl:  7/25/2018 at Unknown time   Atenolol 25 MG Oral Tab Take 12.5 mg by mouth nightly.    Disp:  Rfl:  7/25/2018 at U (36.3 °C) Oral - 23 91 % -   07/26/18 1850 126/81 - - 107 - 96 % -   07/26/18 1708 107/71 - - 119 25 (!) 76 % -   07/26/18 1655 112/83 97.6 °F (36.4 °C) Oral 111 21 (!) 65 % -   07/26/18 1641 107/71 97.6 °F (36.4 °C) Oral 118 18 95 % 201 lb 8 oz (91.4 kg) arthritis     Effusion of lower leg joint     Chondromalacia of patella     Pneumonia     Pulmonary fibrosis (HCC)     Respiratory distress     At risk for falling     Hypervolemia     Dyspnea on exertion     Acute on chronic respiratory failure with hypox

## 2018-07-27 NOTE — PLAN OF CARE
Problem: CARDIOVASCULAR - ADULT  Goal: Maintains optimal cardiac output and hemodynamic stability  INTERVENTIONS:  - Monitor vital signs, rhythm, and trends  - Monitor for bleeding, hypotension and signs of decreased cardiac output  - Evaluate effectivenes independent in completing ADLs (cleaning and cooking provided by [facility]), to include driving; VSS; cardiac monitor showing ST; lung sounds clear, on 10 liters of high flow nasal cannula which is patient's home dose, no cough noted but states he has be

## 2018-07-27 NOTE — PROGRESS NOTES
[facility]  Progress Note     Jud Tam Patient Status:  Inpatient    1940 MRN [id number]   [facility] 2NE-A Attending Meryl Quintanilla MD   Hosp Day # 1 PCP Angy Tan DO     Chief Complaint: CHF    S: Patient feels ab Oral QOD   • Pravastatin Sodium  10 mg Oral Nightly   • Sildenafil Citrate  40 mg Oral TID   • gabapentin  300 mg Oral TID   • insulin detemir  10 Units Subcutaneous Q12H   • Nintedanib Esylate  150 mg Oral 2 times per day   • Insulin Aspart Pen  4-20 Unit

## 2018-07-27 NOTE — PROGRESS NOTES
Problem: CARDIOVASCULAR - ADULT  Goal: Maintains optimal cardiac output and hemodynamic stability  INTERVENTIONS:  - Monitor vital signs, rhythm, and trends  - Monitor for bleeding, hypotension and signs of decreased cardiac output  - Evaluate effectivenes Kourtney Mcdonald for consult.

## 2018-07-27 NOTE — PROGRESS NOTES
[facility]  Advanced Heart Failure Progress Note    Kye Palma Patient Status:  Inpatient    1940 MRN [id number]   [facility] 2NE-A Attending Reyna Nur MD   Hosp Day # 1 PCP Leesa Sawyer DO     Subjective:  Does not fe 01/04/2017     No results for input(s): BNPML in the last 168 hours.     BUN (mg/dL)   Date Value   07/27/2018 17   07/26/2018 19   07/23/2018 16   01/30/2012 16   ----------  CREATININE (mg/dL)   Date Value   01/30/2012 1.2   ----------  Creatinine (mg/dL) chewable tab 8 tablet 8 tablet Oral Q15 Min PRN   Insulin Aspart Pen (NOVOLOG) 100 UNIT/ML flexpen 4-20 Units 4-20 Units Subcutaneous TID CC and HS       Assessment and Plan:  Patient Active Problem List:     Leg pain     Pain in both feet     Peripheral a [email or web address]    7/27/2018  10:06 AM

## 2018-07-28 LAB
ANION GAP SERPL CALC-SCNC: 8 MMOL/L (ref 0–18)
BASOPHILS # BLD AUTO: 0.03 X10(3) UL (ref 0–0.1)
BASOPHILS NFR BLD AUTO: 0.5 %
BUN BLD-MCNC: 20 MG/DL (ref 8–20)
BUN/CREAT SERPL: 15.2 (ref 10–20)
CALCIUM BLD-MCNC: 9.2 MG/DL (ref 8.3–10.3)
CHLORIDE SERPL-SCNC: 99 MMOL/L (ref 101–111)
CO2 SERPL-SCNC: 32 MMOL/L (ref 22–32)
CREAT BLD-MCNC: 1.32 MG/DL (ref 0.7–1.3)
EOSINOPHIL # BLD AUTO: 0.29 X10(3) UL (ref 0–0.3)
EOSINOPHIL NFR BLD AUTO: 4.6 %
ERYTHROCYTE [DISTWIDTH] IN BLOOD BY AUTOMATED COUNT: 19.5 % (ref 11.5–16)
GLUCOSE BLD-MCNC: 160 MG/DL (ref 65–99)
GLUCOSE BLD-MCNC: 166 MG/DL (ref 70–99)
GLUCOSE BLD-MCNC: 168 MG/DL (ref 65–99)
GLUCOSE BLD-MCNC: 257 MG/DL (ref 65–99)
GLUCOSE BLD-MCNC: 312 MG/DL (ref 65–99)
HCT VFR BLD AUTO: 42.5 % (ref 37–53)
HGB BLD-MCNC: 13 G/DL (ref 13–17)
IMMATURE GRANULOCYTE COUNT: 0.01 X10(3) UL (ref 0–1)
IMMATURE GRANULOCYTE RATIO %: 0.2 %
LYMPHOCYTES # BLD AUTO: 1.3 X10(3) UL (ref 0.9–4)
LYMPHOCYTES NFR BLD AUTO: 20.5 %
MCH RBC QN AUTO: 22.6 PG (ref 27–33.2)
MCHC RBC AUTO-ENTMCNC: 30.6 G/DL (ref 31–37)
MCV RBC AUTO: 74 FL (ref 80–99)
MONOCYTES # BLD AUTO: 0.84 X10(3) UL (ref 0.1–1)
MONOCYTES NFR BLD AUTO: 13.2 %
NEUTROPHIL ABS PRELIM: 3.87 X10 (3) UL (ref 1.3–6.7)
NEUTROPHILS # BLD AUTO: 3.87 X10(3) UL (ref 1.3–6.7)
NEUTROPHILS NFR BLD AUTO: 61 %
OSMOLALITY SERPL CALC.SUM OF ELEC: 294 MOSM/KG (ref 275–295)
PLATELET # BLD AUTO: 224 10(3)UL (ref 150–450)
POTASSIUM SERPL-SCNC: 3.6 MMOL/L (ref 3.6–5.1)
RBC # BLD AUTO: 5.74 X10(6)UL (ref 3.8–5.8)
RED CELL DISTRIBUTION WIDTH-SD: 47.5 FL (ref 35.1–46.3)
SODIUM SERPL-SCNC: 139 MMOL/L (ref 136–144)
WBC # BLD AUTO: 6.3 X10(3) UL (ref 4–13)

## 2018-07-28 PROCEDURE — 99232 SBSQ HOSP IP/OBS MODERATE 35: CPT | Performed by: HOSPITALIST

## 2018-07-28 RX ORDER — HALOPERIDOL 5 MG/ML
2 INJECTION INTRAMUSCULAR EVERY 6 HOURS PRN
Status: DISCONTINUED | OUTPATIENT
Start: 2018-07-28 | End: 2018-08-01

## 2018-07-28 NOTE — PROGRESS NOTES
· [facility] Progress Note     Subjective:  Comfortable at rest but significant NOVAK. Good UO overnight.   Feet are  Painful    Objective:   07/28/18  0854   BP: 102/62   Pulse: 112   Resp: 16   Temp: 97.8 °F (36.6 °C)         Neuro: pleasant,

## 2018-07-28 NOTE — PLAN OF CARE
Received pt a/o but forgetful at times, ST on tele at 0700  Pt 10L [place] NC, 15L on exertion  I was at lunch pt became confused ripped off oxygen, pulled apart IV line, found pt with no oxygen, bleeding from IV, and confused sitting at edge of bed.   Pt sta

## 2018-07-28 NOTE — PROGRESS NOTES
[facility]  Progress Note     Gricel Hyde Patient Status:  Inpatient    1940 MRN [id number]   [facility] 2NE-A Attending Marzena Collins MD   Hosp Day # 2 PCP Vineet Mcdonald DO     Chief Complaint: CHF    S: Patient feels ab CC and HS   • enoxaparin  40 mg Subcutaneous Daily   • Clopidogrel Bisulfate  75 mg Oral Nightly   • Potassium Chloride ER  10 mEq Oral QOD   • Pravastatin Sodium  10 mg Oral Nightly   • Sildenafil Citrate  40 mg Oral TID   • gabapentin  300 mg Oral TID

## 2018-07-28 NOTE — PROGRESS NOTES
[facility]  Progress Note    Raphael Valles Patient Status:  Inpatient    1940 MRN [id number]   [facility] 2NE-A Attending Alejo Toure MD   [facility] Day # 2 PCP Leann Gramajo DO     Subjective:  Raphael Valles is a(n) 69year old diastolic dysfunction, PASP 52mmHg, RV dilation with severe dysfunction (unchanged)    Medications Reviewed:    Current Facility-Administered Medications:  Metoprolol Succinate ER (Toprol XL) 24 hr tab 25 mg 25 mg Oral Nightly   digoxin (LANOXIN) tab 125 m OFEV  · Severe pulmonary HTN with known RV dysfunction- on sildenafil, now increasing volume on bumex gtt  · Severe chronic hypoxemia on 10-12L HF baseline.   VQ mismatch  · Mild chronic kidney disease  · Diabetes with neuropathy  · Intermittent confusion-

## 2018-07-28 NOTE — DIETARY NOTE
Nutrition Short Note  Dietitian consult received per heart failure standing order. Attempted to initiate education but pt denied stating he is at the point where he wants to be able to eat what he wanted.  Offered handout on low sodium guidelines but pt den

## 2018-07-29 LAB
ANION GAP SERPL CALC-SCNC: 7 MMOL/L (ref 0–18)
BUN BLD-MCNC: 26 MG/DL (ref 8–20)
BUN/CREAT SERPL: 16 (ref 10–20)
CALCIUM BLD-MCNC: 9.1 MG/DL (ref 8.3–10.3)
CHLORIDE SERPL-SCNC: 99 MMOL/L (ref 101–111)
CO2 SERPL-SCNC: 33 MMOL/L (ref 22–32)
CREAT BLD-MCNC: 1.62 MG/DL (ref 0.7–1.3)
GLUCOSE BLD-MCNC: 209 MG/DL (ref 65–99)
GLUCOSE BLD-MCNC: 266 MG/DL (ref 65–99)
GLUCOSE BLD-MCNC: 279 MG/DL (ref 65–99)
GLUCOSE BLD-MCNC: 315 MG/DL (ref 70–99)
GLUCOSE BLD-MCNC: 357 MG/DL (ref 65–99)
OSMOLALITY SERPL CALC.SUM OF ELEC: 305 MOSM/KG (ref 275–295)
POTASSIUM SERPL-SCNC: 3.9 MMOL/L (ref 3.6–5.1)
SODIUM SERPL-SCNC: 139 MMOL/L (ref 136–144)

## 2018-07-29 PROCEDURE — 99233 SBSQ HOSP IP/OBS HIGH 50: CPT | Performed by: HOSPITALIST

## 2018-07-29 NOTE — PROGRESS NOTES
07/28/18 1918   Clinical Encounter Type   Visited With Patient   Routine Visit Introduction   Continue Visiting No   Referral To ( talked with pt about the polst form. Pt did not want to sign another form.   Pt wanted  to leave copy for

## 2018-07-29 NOTE — PROGRESS NOTES
[facility]  Progress Note    Juwan Emmanuel Patient Status:  Inpatient    1940 MRN [id number]   [facility] 2NE-A Attending Ninfa Plummer MD   [facility] Day # 3 PCP Astrid Client, DO     Subjective:  Juwan Emmanuel is a(n) 69year old input(s): Geraldo Litas, TROPONINT, CKMBINDEX    Cultures:  n/a    Radiology:  LE doppler 7/27- no DVT  TTE 7/27- EF 68158%, grade I diastolic dysfunction, PASP 52mmHg, RV dilation with severe dysfunction (unchanged)    Medications Reviewed:    Afshin Jefferson oral liquid 30 g 30 g Oral Q15 Min PRN   Or      Glucose-Vitamin C (DEX-4) 4-0.006 g chewable tab 8 tablet 8 tablet Oral Q15 Min PRN       Assessment:  · Severe, nearly end stage idiopathic pulmonary fibrosis- remains on OFEV  · Severe pulmonary HTN with k

## 2018-07-29 NOTE — PROGRESS NOTES
· [facility] Progress Note     Subjective:  Still with exertional dyspnea, no real change.   Feels very dry today, still with pain feet    Objective:  110/69  Afebrile  SR  I/O -  551   Labs pending    Cardiac: S1 s2 regular  Lungs:  Decreased

## 2018-07-29 NOTE — RESPIRATORY THERAPY NOTE
RN called and stated patient was desaturating. Upon arrival RT placed pulse ox probe on patient's left ring finger. Patient was saturating 97-99% on a 10L High Flow Nasal Cannula. Patient asked for an oxygen mask for night time use.  RT set up a 50% Venti M

## 2018-07-29 NOTE — PROGRESS NOTES
[facility]  Progress Note     Katie Dat Patient Status:  Inpatient    1940 MRN [id number]   [facility] 2NE-A Attending Elissa Molina MD   Hosp Day # 3 PCP Philly Nevarez DO     Chief Complaint: CHF    S: Patient had epis Oral Nightly   • digoxin  125 mcg Oral Daily   • Insulin Aspart Pen  1-68 Units Subcutaneous TID CC   • Insulin Aspart Pen  1-68 Units Subcutaneous TID CC and HS   • enoxaparin  40 mg Subcutaneous Daily   • Clopidogrel Bisulfate  75 mg Oral Nightly   • Pot

## 2018-07-30 LAB
ANION GAP SERPL CALC-SCNC: 8 MMOL/L (ref 0–18)
BUN BLD-MCNC: 28 MG/DL (ref 8–20)
BUN/CREAT SERPL: 16.7 (ref 10–20)
CALCIUM BLD-MCNC: 9 MG/DL (ref 8.3–10.3)
CHLORIDE SERPL-SCNC: 98 MMOL/L (ref 101–111)
CO2 SERPL-SCNC: 31 MMOL/L (ref 22–32)
CREAT BLD-MCNC: 1.68 MG/DL (ref 0.7–1.3)
GLUCOSE BLD-MCNC: 122 MG/DL (ref 65–99)
GLUCOSE BLD-MCNC: 152 MG/DL (ref 65–99)
GLUCOSE BLD-MCNC: 210 MG/DL (ref 65–99)
GLUCOSE BLD-MCNC: 234 MG/DL (ref 70–99)
GLUCOSE BLD-MCNC: 269 MG/DL (ref 65–99)
GLUCOSE BLD-MCNC: 75 MG/DL (ref 65–99)
OSMOLALITY SERPL CALC.SUM OF ELEC: 297 MOSM/KG (ref 275–295)
POTASSIUM SERPL-SCNC: 3.6 MMOL/L (ref 3.6–5.1)
SODIUM SERPL-SCNC: 137 MMOL/L (ref 136–144)

## 2018-07-30 PROCEDURE — 99232 SBSQ HOSP IP/OBS MODERATE 35: CPT | Performed by: HOSPITALIST

## 2018-07-30 RX ORDER — EPLERENONE 25 MG/1
12.5 TABLET, FILM COATED ORAL DAILY
Status: DISCONTINUED | OUTPATIENT
Start: 2018-07-30 | End: 2018-08-07

## 2018-07-30 RX ORDER — TORSEMIDE 20 MG/1
20 TABLET ORAL
Status: DISCONTINUED | OUTPATIENT
Start: 2018-07-30 | End: 2018-08-07

## 2018-07-30 NOTE — PROGRESS NOTES
[facility]  Progress Note    Juwan Emmanuel Patient Status:  Inpatient    1940 MRN [id number]   [facility] 2NE-A Attending Ninfa Plummer MD   [address] Day # 4 PCP Astrid Malik,         Assessment:  · Severe, nearly end stage ([facility] [address]     Chronic respiratory failure with hypoxia (HCC)     Essential hypertension     Encephalopathy acute      Subjective:  Sunnie [place] is a(n) 69year old male.   No new complaisn    feeling better   mood is better    some cough    Objective:  Oxygen AST  17   --    --    --    --    ALT  27   --    --    --    --    BILT  0.3   --    --    --    --    TP  6.9   --    --    --    --      @MG@      Lab Results  Component Value Date   INR 1.09 03/14/2018   INR 1.00 09/25/2017   INR 1.06 01/04/2017 magnesium oxide (MAG-OX) tab 250 mg 250 mg Oral Daily PRN   Nintedanib Esylate CAPS 150 mg 150 mg Oral 2 times per day   glucose (DEX4) oral liquid 15 g 15 g Oral Q15 Min PRN   Or      Glucose-Vitamin C (DEX-4) 4-0.006 g chewable tab 4 tablet 4 tablet Or

## 2018-07-30 NOTE — PROGRESS NOTES
[facility]  Progress Note     Ajay Elliott Patient Status:  Inpatient    1940 MRN [id number]   [facility] 2NE-A Attending Exeter MD Erick   [facility] Day # 4 PCP Tommie Burch DO     Chief Complaint: CHF    S: Patient had mild TROP, CK in the last 168 hours. Imaging: Imaging data reviewed in Epic.     Medications:   • insulin detemir  15 Units Subcutaneous Q12H   • metoprolol succinate  25 mg Oral Nightly   • digoxin  125 mcg Oral Daily   • Insulin Aspart Pen  1-68 Units

## 2018-07-30 NOTE — PROGRESS NOTES
[facility]  Cardiology Progress Note    Subjective:  No chest pain or shortness of breath.     Objective:  /73 (BP Location: Left arm)   Pulse 99   Temp 98.3 °F (36.8 °C) (Oral)   Resp 14   Wt 185 lb 10 oz (84.2 kg)   SpO2 96%   BMI 25.18 kg/m² out -5.5L net fluid since admission (-16lbs). Lower extremities are much improved, patient HR starting to elevate, suspected dehydration with IV diuretics.  Plans to transition to home oral diuretic (torsemide) at an increased dose to maintain adequate diur

## 2018-07-30 NOTE — CARDIAC REHAB
Cardiac rehab heart failure education completed with patient. Patient has binder from previous visit.

## 2018-07-31 ENCOUNTER — APPOINTMENT (OUTPATIENT)
Dept: GENERAL RADIOLOGY | Facility: HOSPITAL | Age: 78
DRG: 291 | End: 2018-07-31
Attending: INTERNAL MEDICINE
Payer: MEDICARE

## 2018-07-31 LAB
ANION GAP SERPL CALC-SCNC: 7 MMOL/L (ref 0–18)
BUN BLD-MCNC: 31 MG/DL (ref 8–20)
BUN/CREAT SERPL: 19.6 (ref 10–20)
CALCIUM BLD-MCNC: 9 MG/DL (ref 8.3–10.3)
CHLORIDE SERPL-SCNC: 98 MMOL/L (ref 101–111)
CO2 SERPL-SCNC: 34 MMOL/L (ref 22–32)
CREAT BLD-MCNC: 1.58 MG/DL (ref 0.7–1.3)
ERYTHROCYTE [DISTWIDTH] IN BLOOD BY AUTOMATED COUNT: 18.6 % (ref 11.5–16)
GLUCOSE BLD-MCNC: 112 MG/DL (ref 65–99)
GLUCOSE BLD-MCNC: 117 MG/DL (ref 70–99)
GLUCOSE BLD-MCNC: 183 MG/DL (ref 65–99)
GLUCOSE BLD-MCNC: 237 MG/DL (ref 65–99)
GLUCOSE BLD-MCNC: 246 MG/DL (ref 65–99)
GLUCOSE BLD-MCNC: 257 MG/DL (ref 65–99)
GLUCOSE BLD-MCNC: 67 MG/DL (ref 65–99)
HCT VFR BLD AUTO: 43.1 % (ref 37–53)
HGB BLD-MCNC: 12.6 G/DL (ref 13–17)
MCH RBC QN AUTO: 22.2 PG (ref 27–33.2)
MCHC RBC AUTO-ENTMCNC: 29.2 G/DL (ref 31–37)
MCV RBC AUTO: 76 FL (ref 80–99)
OSMOLALITY SERPL CALC.SUM OF ELEC: 296 MOSM/KG (ref 275–295)
PLATELET # BLD AUTO: 242 10(3)UL (ref 150–450)
POTASSIUM SERPL-SCNC: 3.6 MMOL/L (ref 3.6–5.1)
RBC # BLD AUTO: 5.67 X10(6)UL (ref 3.8–5.8)
RED CELL DISTRIBUTION WIDTH-SD: 48.1 FL (ref 35.1–46.3)
SODIUM SERPL-SCNC: 139 MMOL/L (ref 136–144)
WBC # BLD AUTO: 8.4 X10(3) UL (ref 4–13)

## 2018-07-31 PROCEDURE — 71045 X-RAY EXAM CHEST 1 VIEW: CPT | Performed by: INTERNAL MEDICINE

## 2018-07-31 PROCEDURE — 99232 SBSQ HOSP IP/OBS MODERATE 35: CPT | Performed by: HOSPITALIST

## 2018-07-31 NOTE — PROGRESS NOTES
[facility]  Progress Note    Arnaldo Prom Patient Status:  Inpatient    1940 MRN [id number]   [facility] 2NE-A Attending Elissa Vazquez MD   [facility] Day # 5 PCP Dolly Kumar DO     Assessment:  · Severe, nearly end stage idio obese (HCC)     Pulmonary HTN (HCC)     Syncope     CAD in native artery     Benign essential HTN     Nasal septal perforation     Nasal vestibulitis     Hypoxia     Pulmonary hypertension (HCC)     Acute congestive heart failure, unspecified heart failure Labs   07/31/18  0543   WBC 8.4   HGB 12.6*   .0       Recent Labs   Lab  07/26/18   1724  07/27/18   0605  07/28/18   0555  07/29/18   0834  07/30/18   0616  07/31/18   0543   GLU  368*  121*  166*  315*  234*  117*   BUN  19  17  20  26*  28*  31* and HS   Saline Nasal Spray (SALINE MIST) 1 spray 1 spray Each Nare Q3H PRN   acetaminophen (TYLENOL) tab 650 mg 650 mg Oral Q6H PRN   temazepam (RESTORIL) cap 15 mg 15 mg Oral Nightly PRN   magnesium hydroxide (MILK OF MAGNESIA) 400 MG/5ML suspension 30 m

## 2018-07-31 NOTE — PROGRESS NOTES
[facility]  Advanced Heart Failure Progress Note    Lindaann Fall Patient Status:  Inpatient    1940 MRN [id number]   [facility] 2NE-A Attending Elmer Kayser, MD   [facility] Day # 5 PCP Juan Luis Bruno DO     Subjective:  Still very 01/04/2017     No results for input(s): BNPML in the last 168 hours.     BUN (mg/dL)   Date Value   07/31/2018 31 (H)   07/30/2018 28 (H)   07/29/2018 26 (H)   01/30/2012 16   ----------  CREATININE (mg/dL)   Date Value   01/30/2012 1.2   ----------  Creati glucose (DEX4) oral liquid 15 g 15 g Oral Q15 Min PRN   Or      Glucose-Vitamin C (DEX-4) 4-0.006 g chewable tab 4 tablet 4 tablet Oral Q15 Min PRN   Or      dextrose 50 % injection 50 mL 50 mL Intravenous Q15 Min PRN   Or      glucose (DEX4) oral liquid Director, [address]  [address], [address] [facility] [address] [address]  Phone: [phone]  Fax: [phone]  E-mail: [email or web address]    7

## 2018-07-31 NOTE — PROGRESS NOTES
[facility]  Progress Note     Juwan Emmanuel Patient Status:  Inpatient    1940 MRN [id number]   [facility] 2NE-A Attending Ninfa Plummer MD   [facility] Day # 5 PCP Astrid Malik,      Chief Complaint: CHF    S: Patient feels ok PTP, INR in the last 168 hours. No results for input(s): TROP, CK in the last 168 hours. Imaging: Imaging data reviewed in Epic.     Medications:   • insulin detemir  18 Units Subcutaneous Q12H   • torsemide  20 mg Oral BID (Diuretic)   • epleren to: home    Plan of care discussed with patient or family at bedside.     Pamella Joyner MD

## 2018-07-31 NOTE — PROGRESS NOTES
[facility]  Cardiology Progress Note    Subjective:  Slowly feeling better. States his lower extremity swelling is much improved.     Objective:  BP 99/53 (BP Location: Left arm)   Pulse 94   Temp 98.1 °F (36.7 °C) (Oral)   Resp 18   Wt 186 lb 3.2 oz encephalopathy  · DNR    Plan:    - Continue oral diuretics  - Discharge planning  - Reviewed with Dr. Rory Mayo at chart - ongoing discussions about end of life care underway. Patient is DNR but not ready for option of hospice.       MARIO Roman

## 2018-08-01 ENCOUNTER — APPOINTMENT (OUTPATIENT)
Dept: GENERAL RADIOLOGY | Facility: HOSPITAL | Age: 78
DRG: 291 | End: 2018-08-01
Attending: NURSE PRACTITIONER
Payer: MEDICARE

## 2018-08-01 LAB
ALLENS TEST: POSITIVE
ALLENS TEST: POSITIVE
ANION GAP SERPL CALC-SCNC: 8 MMOL/L (ref 0–18)
ANION GAP SERPL CALC-SCNC: 8 MMOL/L (ref 0–18)
APTT PPP: 130 SECONDS (ref 26.1–34.6)
APTT PPP: 41.7 SECONDS (ref 26.1–34.6)
ARTERIAL BLD GAS O2 SATURATION: 72 % (ref 92–100)
ARTERIAL BLD GAS O2 SATURATION: 90 % (ref 92–100)
ARTERIAL BLOOD GAS BASE EXCESS: 4.3
ARTERIAL BLOOD GAS BASE EXCESS: 4.5
ARTERIAL BLOOD GAS HCO3: 26.6 MEQ/L (ref 22–26)
ARTERIAL BLOOD GAS HCO3: 28.3 MEQ/L (ref 22–26)
ARTERIAL BLOOD GAS PCO2: 31 MM HG (ref 35–45)
ARTERIAL BLOOD GAS PCO2: 40 MM HG (ref 35–45)
ARTERIAL BLOOD GAS PH: 7.47 (ref 7.35–7.45)
ARTERIAL BLOOD GAS PH: 7.55 (ref 7.35–7.45)
ARTERIAL BLOOD GAS PO2: 35 MM HG (ref 80–105)
ARTERIAL BLOOD GAS PO2: 58 MM HG (ref 80–105)
ATRIAL RATE: 108 BPM
BNP SERPL-MCNC: 633 PG/ML (ref 2–99)
BUN BLD-MCNC: 34 MG/DL (ref 8–20)
BUN BLD-MCNC: 36 MG/DL (ref 8–20)
BUN/CREAT SERPL: 24.1 (ref 10–20)
BUN/CREAT SERPL: 24.3 (ref 10–20)
CALCIUM BLD-MCNC: 8.9 MG/DL (ref 8.3–10.3)
CALCIUM BLD-MCNC: 9 MG/DL (ref 8.3–10.3)
CALCULATED O2 SATURATION: 78 % (ref 92–100)
CALCULATED O2 SATURATION: 92 % (ref 92–100)
CARBOXYHEMOGLOBIN: 1.9 % SAT (ref 0–3)
CARBOXYHEMOGLOBIN: 2 % SAT (ref 0–3)
CHLORIDE SERPL-SCNC: 98 MMOL/L (ref 101–111)
CHLORIDE SERPL-SCNC: 99 MMOL/L (ref 101–111)
CO2 SERPL-SCNC: 29 MMOL/L (ref 22–32)
CO2 SERPL-SCNC: 30 MMOL/L (ref 22–32)
CREAT BLD-MCNC: 1.41 MG/DL (ref 0.7–1.3)
CREAT BLD-MCNC: 1.48 MG/DL (ref 0.7–1.3)
CRP SERPL-MCNC: 12 MG/DL (ref ?–1)
D-DIMER: 3.44 UG/ML FEU (ref 0–0.49)
ERYTHROCYTE [DISTWIDTH] IN BLOOD BY AUTOMATED COUNT: 19.4 % (ref 11.5–16)
GLUCOSE BLD-MCNC: 153 MG/DL (ref 70–99)
GLUCOSE BLD-MCNC: 158 MG/DL (ref 65–99)
GLUCOSE BLD-MCNC: 184 MG/DL (ref 65–99)
GLUCOSE BLD-MCNC: 186 MG/DL (ref 70–99)
GLUCOSE BLD-MCNC: 191 MG/DL (ref 65–99)
GLUCOSE BLD-MCNC: 212 MG/DL (ref 65–99)
GLUCOSE BLD-MCNC: 314 MG/DL (ref 65–99)
HCT VFR BLD AUTO: 43.5 % (ref 37–53)
HGB BLD-MCNC: 13 G/DL (ref 13–17)
L/M: 10 L/MIN
L/M: 15 L/MIN
MCH RBC QN AUTO: 22.6 PG (ref 27–33.2)
MCHC RBC AUTO-ENTMCNC: 29.9 G/DL (ref 31–37)
MCV RBC AUTO: 75.5 FL (ref 80–99)
METHEMOGLOBIN: 0.5 % SAT (ref 0.4–1.5)
METHEMOGLOBIN: 0.5 % SAT (ref 0.4–1.5)
OSMOLALITY SERPL CALC.SUM OF ELEC: 293 MOSM/KG (ref 275–295)
OSMOLALITY SERPL CALC.SUM OF ELEC: 294 MOSM/KG (ref 275–295)
P AXIS: 33 DEGREES
P-R INTERVAL: 150 MS
P/F RATIO: 170.7 MMHG
P/F RATIO: 279.4 MMHG
PATIENT TEMPERATURE: 98.1 F
PATIENT TEMPERATURE: 98.1 F
PLATELET # BLD AUTO: 277 10(3)UL (ref 150–450)
POTASSIUM SERPL-SCNC: 4 MMOL/L (ref 3.6–5.1)
POTASSIUM SERPL-SCNC: 4.2 MMOL/L (ref 3.6–5.1)
PROCALCITONIN SERPL-MCNC: 0.15 NG/ML
Q-T INTERVAL: 332 MS
QRS DURATION: 78 MS
QTC CALCULATION (BEZET): 444 MS
R AXIS: -74 DEGREES
RBC # BLD AUTO: 5.76 X10(6)UL (ref 3.8–5.8)
RED CELL DISTRIBUTION WIDTH-SD: 47.9 FL (ref 35.1–46.3)
SED RATE-ML: 48 MM/HR (ref 0–12)
SODIUM SERPL-SCNC: 136 MMOL/L (ref 136–144)
SODIUM SERPL-SCNC: 136 MMOL/L (ref 136–144)
T AXIS: -11 DEGREES
TOTAL HEMOGLOBIN: 12.6 G/DL (ref 12.6–17.4)
TOTAL HEMOGLOBIN: 13 G/DL (ref 12.6–17.4)
TROPONIN I SERPL-MCNC: <0.046 NG/ML (ref ?–0.05)
VENTRICULAR RATE: 108 BPM
WBC # BLD AUTO: 11.8 X10(3) UL (ref 4–13)

## 2018-08-01 PROCEDURE — 71045 X-RAY EXAM CHEST 1 VIEW: CPT | Performed by: NURSE PRACTITIONER

## 2018-08-01 PROCEDURE — 99232 SBSQ HOSP IP/OBS MODERATE 35: CPT | Performed by: INTERNAL MEDICINE

## 2018-08-01 RX ORDER — HEPARIN SODIUM AND DEXTROSE 10000; 5 [USP'U]/100ML; G/100ML
INJECTION INTRAVENOUS CONTINUOUS
Status: DISCONTINUED | OUTPATIENT
Start: 2018-08-01 | End: 2018-08-02

## 2018-08-01 RX ORDER — HEPARIN SODIUM AND DEXTROSE 10000; 5 [USP'U]/100ML; G/100ML
18 INJECTION INTRAVENOUS ONCE
Status: COMPLETED | OUTPATIENT
Start: 2018-08-01 | End: 2018-08-01

## 2018-08-01 RX ORDER — IPRATROPIUM BROMIDE AND ALBUTEROL SULFATE 2.5; .5 MG/3ML; MG/3ML
3 SOLUTION RESPIRATORY (INHALATION)
Status: DISCONTINUED | OUTPATIENT
Start: 2018-08-01 | End: 2018-08-07

## 2018-08-01 RX ORDER — LEVOFLOXACIN 5 MG/ML
750 INJECTION, SOLUTION INTRAVENOUS
Status: DISCONTINUED | OUTPATIENT
Start: 2018-08-01 | End: 2018-08-05

## 2018-08-01 RX ORDER — NITROGLYCERIN 0.4 MG/1
TABLET SUBLINGUAL
Status: COMPLETED
Start: 2018-08-01 | End: 2018-08-01

## 2018-08-01 RX ORDER — MORPHINE SULFATE 4 MG/ML
INJECTION, SOLUTION INTRAMUSCULAR; INTRAVENOUS
Status: COMPLETED
Start: 2018-08-01 | End: 2018-08-01

## 2018-08-01 RX ORDER — HEPARIN SODIUM 5000 [USP'U]/ML
80 INJECTION INTRAVENOUS; SUBCUTANEOUS ONCE
Status: DISCONTINUED | OUTPATIENT
Start: 2018-08-01 | End: 2018-08-01 | Stop reason: SDUPTHER

## 2018-08-01 RX ORDER — CLINDAMYCIN PHOSPHATE 600 MG/50ML
600 INJECTION INTRAVENOUS EVERY 8 HOURS
Status: DISCONTINUED | OUTPATIENT
Start: 2018-08-01 | End: 2018-08-02

## 2018-08-01 RX ORDER — MORPHINE SULFATE 4 MG/ML
4 INJECTION, SOLUTION INTRAMUSCULAR; INTRAVENOUS EVERY 2 HOUR PRN
Status: CANCELLED | OUTPATIENT
Start: 2018-08-01

## 2018-08-01 RX ORDER — NITROGLYCERIN 0.4 MG/1
0.4 TABLET SUBLINGUAL EVERY 5 MIN PRN
Status: DISCONTINUED | OUTPATIENT
Start: 2018-08-01 | End: 2018-08-01

## 2018-08-01 RX ORDER — MORPHINE SULFATE 4 MG/ML
2 INJECTION, SOLUTION INTRAMUSCULAR; INTRAVENOUS EVERY 4 HOURS PRN
Status: DISCONTINUED | OUTPATIENT
Start: 2018-08-01 | End: 2018-08-02 | Stop reason: DRUGHIGH

## 2018-08-01 RX ORDER — HEPARIN SODIUM 5000 [USP'U]/ML
80 INJECTION INTRAVENOUS; SUBCUTANEOUS ONCE
Status: COMPLETED | OUTPATIENT
Start: 2018-08-01 | End: 2018-08-01

## 2018-08-01 RX ORDER — MORPHINE SULFATE 4 MG/ML
2 INJECTION, SOLUTION INTRAMUSCULAR; INTRAVENOUS EVERY 2 HOUR PRN
Status: CANCELLED | OUTPATIENT
Start: 2018-08-01

## 2018-08-01 NOTE — CM/SW NOTE
08/01/18 1500   CM/SW Referral Data   Referral Source Physician   Reason for Referral Discharge planning   Informant Patient   Readmission Assessment   Factors that patient feels contributed to this readmission Other (only choose if nothing else applies

## 2018-08-01 NOTE — PROGRESS NOTES
[facility]  Progress Note    Juwan Emmanuel Patient Status:  Inpatient    1940 MRN [id number]   [facility] 2NE-A Attending Ninfa Plummer MD   [address] Day # 6 PCP Astrid Client, DO     Subjective:  Juwan Emmanuel is a(n) 69year old 08/01/18   0548   GLU  234*  117*  153*   BUN  28*  31*  36*   CREATSERUM  1.68*  1.58*  1.48*   GFRAA  45*  48*  52*   GFRNAA  39*  42*  45*   CA  9.0  9.0  8.9   NA  137  139  136   K  3.6  3.6  4.0   CL  98*  98*  99*   CO2  31.0  34.0*  29.0     Recent

## 2018-08-01 NOTE — CM/SW NOTE
Attempted to see patient re: d/c plan, home health, transport information - currently with nursing staff, c/o sob. Will follow up when more appropriate.     Joby Gonzalez RN,   Phone [phone]  Pager [phone]

## 2018-08-01 NOTE — PLAN OF CARE
Pt alert able to make needs known. Pt noted with some periods of confusion asking to go get his  \"stuff\" from a car that he claim he was in this afternoon. Redirection and reorientation needed. SOB noted with exertion.  Pt only agreeable to moving from bed

## 2018-08-01 NOTE — PROGRESS NOTES
[facility]  Progress Note    Lev Minaya Patient Status:  Inpatient    1940 MRN [id number]   [facility] 2NE-A Attending Vandana Fuentes MD   [facility] Day # 6 PCP Irma Sanders DO       Assessment and Plan:  Patient Active Problem episodes of confusion. He is completely alert and oriented now. There may be some effects from hypoxia .   Social work/hospitalists/pulmonary weighing in on what services he may need as an outpatient and if he needs to be in a living situation with more hel Facility-Administered Medications:  insulin detemir (LEVEMIR) 100 UNIT/ML flextouch 18 Units 18 Units Subcutaneous Daily   insulin detemir (LEVEMIR) 100 UNIT/ML flextouch 15 Units 15 Units Subcutaneous Nightly   torsemide (DEMADEX) tab 20 mg 20 mg Oral BID MD  8/1/2018  8:36 AM

## 2018-08-01 NOTE — PROGRESS NOTES
[facility]  Progress Note     Gerome Bumpers Patient Status:  Inpatient    1940 MRN [id number]   [facility] 2NE-A Attending Elvira Lozano MD   [address] Day # 6 PCP Aimee Lomeli DO     Chief Complaint: \"I'm tired like I had a l TP  6.9   --    --    --    --     < > = values in this interval not displayed. Estimated Creatinine Clearance: 46.6 mL/min (A) (based on SCr of 1.48 mg/dL (H)). No results for input(s): PTP, INR in the last 168 hours.     No results for input(s) care discussed with patient and RN.      Addendum; Informed by RN that pt is c/p bilateral CP, increase SOB and hypoxia. ABG and CXR reviewed.  Patient has been examined by bedside.   - At this time will get VQ scan to rule out PE, however may not be a

## 2018-08-01 NOTE — PLAN OF CARE
Problem: RESPIRATORY - ADULT  Goal: Achieves optimal ventilation and oxygenation  INTERVENTIONS:  - Assess for changes in respiratory status  - Assess for changes in mentation and behavior  - Position to facilitate oxygenation and minimize respiratory effo events. Pt now on vapotherm. States he no longer has pain. Still moans in bed and appears to have general aches, pains and weakness. Resps remain 20-24. Continue close monitoring of pt.

## 2018-08-02 ENCOUNTER — APPOINTMENT (OUTPATIENT)
Dept: GENERAL RADIOLOGY | Facility: HOSPITAL | Age: 78
DRG: 291 | End: 2018-08-02
Attending: INTERNAL MEDICINE
Payer: MEDICARE

## 2018-08-02 PROBLEM — Z51.5 PALLIATIVE CARE ENCOUNTER: Status: ACTIVE | Noted: 2018-08-02

## 2018-08-02 PROBLEM — Z71.89 GOALS OF CARE, COUNSELING/DISCUSSION: Status: ACTIVE | Noted: 2018-08-02

## 2018-08-02 LAB
APTT PPP: 91.4 SECONDS (ref 26.1–34.6)
BASOPHILS # BLD AUTO: 0.02 X10(3) UL (ref 0–0.1)
BASOPHILS NFR BLD AUTO: 0.2 %
BILIRUB UR QL STRIP.AUTO: NEGATIVE
BUN BLD-MCNC: 37 MG/DL (ref 8–20)
CALCIUM BLD-MCNC: 8.4 MG/DL (ref 8.3–10.3)
CHLORIDE SERPL-SCNC: 97 MMOL/L (ref 101–111)
CO2 SERPL-SCNC: 32 MMOL/L (ref 22–32)
COLOR UR AUTO: YELLOW
CREAT BLD-MCNC: 1.59 MG/DL (ref 0.7–1.3)
EOSINOPHIL # BLD AUTO: 0 X10(3) UL (ref 0–0.3)
EOSINOPHIL NFR BLD AUTO: 0 %
ERYTHROCYTE [DISTWIDTH] IN BLOOD BY AUTOMATED COUNT: 19.4 % (ref 11.5–16)
GLUCOSE BLD-MCNC: 143 MG/DL (ref 65–99)
GLUCOSE BLD-MCNC: 162 MG/DL (ref 65–99)
GLUCOSE BLD-MCNC: 180 MG/DL (ref 65–99)
GLUCOSE BLD-MCNC: 181 MG/DL (ref 70–99)
GLUCOSE UR STRIP.AUTO-MCNC: NEGATIVE MG/DL
HAV IGM SER QL: 2 MG/DL (ref 1.8–2.5)
HCT VFR BLD AUTO: 40.2 % (ref 37–53)
HGB BLD-MCNC: 12 G/DL (ref 13–17)
HYALINE CASTS #/AREA URNS AUTO: PRESENT /LPF
IMMATURE GRANULOCYTE COUNT: 0.09 X10(3) UL (ref 0–1)
IMMATURE GRANULOCYTE RATIO %: 1.1 %
KETONES UR STRIP.AUTO-MCNC: NEGATIVE MG/DL
LYMPHOCYTES # BLD AUTO: 1.21 X10(3) UL (ref 0.9–4)
LYMPHOCYTES NFR BLD AUTO: 14.8 %
MCH RBC QN AUTO: 22.6 PG (ref 27–33.2)
MCHC RBC AUTO-ENTMCNC: 29.9 G/DL (ref 31–37)
MCV RBC AUTO: 75.6 FL (ref 80–99)
MONOCYTES # BLD AUTO: 1.12 X10(3) UL (ref 0.1–1)
MONOCYTES NFR BLD AUTO: 13.7 %
NEUTROPHIL ABS PRELIM: 5.76 X10 (3) UL (ref 1.3–6.7)
NEUTROPHILS # BLD AUTO: 5.76 X10(3) UL (ref 1.3–6.7)
NEUTROPHILS NFR BLD AUTO: 70.2 %
NITRITE UR QL STRIP.AUTO: NEGATIVE
PH UR STRIP.AUTO: 5 [PH] (ref 4.5–8)
PLATELET # BLD AUTO: 200 10(3)UL (ref 150–450)
POTASSIUM SERPL-SCNC: 4.2 MMOL/L (ref 3.6–5.1)
PROT UR STRIP.AUTO-MCNC: 30 MG/DL
RBC # BLD AUTO: 5.32 X10(6)UL (ref 3.8–5.8)
RED CELL DISTRIBUTION WIDTH-SD: 49.3 FL (ref 35.1–46.3)
SODIUM SERPL-SCNC: 135 MMOL/L (ref 136–144)
SP GR UR STRIP.AUTO: 1.01 (ref 1–1.03)
UROBILINOGEN UR STRIP.AUTO-MCNC: <2 MG/DL
WBC # BLD AUTO: 8.2 X10(3) UL (ref 4–13)
WBC #/AREA URNS AUTO: >50 /HPF
WBC CLUMPS UR QL AUTO: PRESENT
YEAST URINE: PRESENT

## 2018-08-02 PROCEDURE — 99222 1ST HOSP IP/OBS MODERATE 55: CPT | Performed by: CLINICAL NURSE SPECIALIST

## 2018-08-02 PROCEDURE — 99233 SBSQ HOSP IP/OBS HIGH 50: CPT | Performed by: INTERNAL MEDICINE

## 2018-08-02 PROCEDURE — 71045 X-RAY EXAM CHEST 1 VIEW: CPT | Performed by: INTERNAL MEDICINE

## 2018-08-02 RX ORDER — MORPHINE SULFATE 4 MG/ML
2 INJECTION, SOLUTION INTRAMUSCULAR; INTRAVENOUS EVERY 2 HOUR PRN
Status: DISCONTINUED | OUTPATIENT
Start: 2018-08-02 | End: 2018-08-02

## 2018-08-02 RX ORDER — MORPHINE SULFATE 4 MG/ML
0.5 INJECTION, SOLUTION INTRAMUSCULAR; INTRAVENOUS
Status: DISCONTINUED | OUTPATIENT
Start: 2018-08-02 | End: 2018-08-07

## 2018-08-02 RX ORDER — LORAZEPAM 2 MG/ML
2 INJECTION INTRAMUSCULAR EVERY 4 HOURS PRN
Status: DISCONTINUED | OUTPATIENT
Start: 2018-08-02 | End: 2018-08-07

## 2018-08-02 RX ORDER — MORPHINE SULFATE 4 MG/ML
1 INJECTION, SOLUTION INTRAMUSCULAR; INTRAVENOUS
Status: DISCONTINUED | OUTPATIENT
Start: 2018-08-02 | End: 2018-08-02

## 2018-08-02 RX ORDER — LORAZEPAM 2 MG/ML
1 INJECTION INTRAMUSCULAR EVERY 4 HOURS PRN
Status: DISCONTINUED | OUTPATIENT
Start: 2018-08-02 | End: 2018-08-07

## 2018-08-02 RX ORDER — ONDANSETRON 4 MG/1
4 TABLET, ORALLY DISINTEGRATING ORAL EVERY 6 HOURS PRN
Status: DISCONTINUED | OUTPATIENT
Start: 2018-08-02 | End: 2018-08-07

## 2018-08-02 RX ORDER — ALFUZOSIN HYDROCHLORIDE 10 MG/1
10 TABLET, EXTENDED RELEASE ORAL
Status: DISCONTINUED | OUTPATIENT
Start: 2018-08-03 | End: 2018-08-06

## 2018-08-02 RX ORDER — LORAZEPAM 2 MG/ML
0.5 INJECTION INTRAMUSCULAR EVERY 4 HOURS PRN
Status: DISCONTINUED | OUTPATIENT
Start: 2018-08-02 | End: 2018-08-07

## 2018-08-02 RX ORDER — ONDANSETRON 2 MG/ML
4 INJECTION INTRAMUSCULAR; INTRAVENOUS EVERY 6 HOURS PRN
Status: DISCONTINUED | OUTPATIENT
Start: 2018-08-02 | End: 2018-08-07

## 2018-08-02 RX ORDER — MORPHINE SULFATE 4 MG/ML
2 INJECTION, SOLUTION INTRAMUSCULAR; INTRAVENOUS EVERY 2 HOUR PRN
Status: DISCONTINUED | OUTPATIENT
Start: 2018-08-02 | End: 2018-08-07

## 2018-08-02 NOTE — PROGRESS NOTES
[facility]  Cardiology Progress Note    Subjective: Increasing work of breathing with elevated HR's in the 120's. Patient is on a HFNC.  o2 sats are 90-94%    Objective:  /64 (BP Location: Left arm)   Pulse 107   Temp 98.7 °F (37.1 °C) (Oral) CHF w/ EF ~45% and marked RV dysfunction. Patient does not appear to be in fluid overload. · Suspected PE: patient became SOB 8/1, tachycardic and demonstrating laborious breathing. Patient symptoms improved with HFNC and morphine.  Patient started on hep

## 2018-08-02 NOTE — PHYSICAL THERAPY NOTE
PHYSICAL THERAPY TREATMENT NOTE - INPATIENT    Room Number: 1726/2606-I     Session: 1   Number of Visits to Meet Established Goals: 2    Presenting Problem: CHF, pulmonary fibrosis    Problem List  Active Problems:    Acute on chronic systolic Hussain Hove promotion;Breathing techniques;Repositioning    BALANCE                                                                                                                     Static Sitting: Good  Dynamic Sitting: Good           Static [address] [place] Reaching, Scapular Retraction and shoulder rolls     Position Sitting     Repetitions   10   Sets   1     Patient End of Session: Up in chair;Needs met;Call light within reach;RN aware of session/findings; All patient questions and concerns addressed

## 2018-08-02 NOTE — PROGRESS NOTES
Pt received at 1500. Pt, family and palliative care discussing hospice and comfort care measures. Pt and family made the decision for hospice care.  Pt denies any pain at this time, resting comfortably with daughter and friend at the bedside providing suppo

## 2018-08-02 NOTE — PROGRESS NOTES
08/02/18 1332   Clinical Encounter Type   Visited With Patient   Routine Visit Introduction   Continue Visiting Yes   Patient's Supportive Strategies/Resources  assessed the spiritual resources , the community supports, and patient's own fears a

## 2018-08-02 NOTE — PROGRESS NOTES
[facility]  Progress Note     Sandor Jones Patient Status:  Inpatient    1940 MRN [id number]   [facility] 2NE-A Attending Frances Baca MD   [facility] Day # 7 PCP Iris Gauthier DO     Chief Complaint: SOB    S: Patient is now o --    --     < > = values in this interval not displayed. Estimated Creatinine Clearance: 43.4 mL/min (A) (based on SCr of 1.59 mg/dL (H)). No results for input(s): PTP, INR in the last 168 hours.     Recent Labs   Lab  08/01/18   1148   TROP  <0.0 1:20  12. DL  13. Alkalosis     Plan of care:   I have once again met with patient by bedside and discussed goals of care, poor overall prognosis.  He understands his overall prognosis but does not seem to be ready to make a decision in  Regards to comfort

## 2018-08-02 NOTE — CM/SW NOTE
Received order to call family, discussed care needs with Dr Carol Ann Mejia and appropriate for palliative care consult, will await PC meeting and follow up with patient/family for d/c needs when appropriate.     Zbigniew Matamoros RN,   Phone [phone]

## 2018-08-02 NOTE — PROGRESS NOTES
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 He seems excepting.   Plan to

## 2018-08-02 NOTE — CONSULTS
[address]  [id number]  Hospital Day #7  Date of Consult: 08/02/18       Reason for Consultation: Consult requested for evaluation of palliative care needs and goals of care discussion.     Hi Medications: Complete list reviewed. Active palliative care medications include morphine for dyspnea.     Review of Systems:  GEN:     reports dyspnea    Physical Exam:  GEN:  Fatigued, dyspneic  Neuro:  Awake, alert and oriented x3  Respiratory:  Even APRN  Palliative Care Nurse Practitioner  Pager [phone], Phone [phone]  8/2/2018  2:16 PM

## 2018-08-02 NOTE — PALLIATIVE CARE NOTE
Palliative care consultation order noted. Patient's daughter/Lashonda would like to participate in any [address] conversations. Christine Manzanares will contact this writer with her availability. Consultation note will follow meeting with patient and family.   Thank you for th

## 2018-08-02 NOTE — CM/SW NOTE
New order for [facility] eval. After meeting with palliative care apn, pt wanting comfort measures, pt/ dtr agree to hospice eval. Referral sent to [facility].

## 2018-08-02 NOTE — PLAN OF CARE
Problem: Diabetes/Glucose Control  Goal: Glucose maintained within prescribed range  INTERVENTIONS:  - Monitor Blood Glucose as ordered  - Assess for signs and symptoms of hyperglycemia and hypoglycemia  - Administer ordered medications to maintain glucose Evaluate fluid balance, assess for edema, trend weights   Outcome: Progressing  Denies chest pain or shortness of breath  Vital signs are stable  Sinus tach on monitor, denies pain, BP stable  Am medications given , oral diuretics  Goal: Absence of cardiac increasing activity/tolerance for mobility and gait  - Educate and engage patient/family in tolerated activity level and precautions  - Recommend use of  RW for transfers and ambulation   Outcome: Not Progressing  Not able to participate with getting out o

## 2018-08-03 LAB
ANION GAP SERPL CALC-SCNC: 12 MMOL/L (ref 0–18)
BUN BLD-MCNC: 51 MG/DL (ref 8–20)
BUN/CREAT SERPL: 23.8 (ref 10–20)
CALCIUM BLD-MCNC: 8.7 MG/DL (ref 8.3–10.3)
CHLORIDE SERPL-SCNC: 94 MMOL/L (ref 101–111)
CO2 SERPL-SCNC: 25 MMOL/L (ref 22–32)
CREAT BLD-MCNC: 2.14 MG/DL (ref 0.7–1.3)
GLUCOSE BLD-MCNC: 174 MG/DL (ref 70–99)
GLUCOSE BLD-MCNC: 214 MG/DL (ref 65–99)
GLUCOSE BLD-MCNC: 239 MG/DL (ref 65–99)
GLUCOSE BLD-MCNC: 293 MG/DL (ref 65–99)
HAV IGM SER QL: 2.3 MG/DL (ref 1.8–2.5)
OSMOLALITY SERPL CALC.SUM OF ELEC: 290 MOSM/KG (ref 275–295)
POTASSIUM SERPL-SCNC: 4.8 MMOL/L (ref 3.6–5.1)
SODIUM SERPL-SCNC: 131 MMOL/L (ref 136–144)

## 2018-08-03 PROCEDURE — 99232 SBSQ HOSP IP/OBS MODERATE 35: CPT | Performed by: INTERNAL MEDICINE

## 2018-08-03 PROCEDURE — 99231 SBSQ HOSP IP/OBS SF/LOW 25: CPT | Performed by: CLINICAL NURSE SPECIALIST

## 2018-08-03 RX ORDER — MAGNESIUM OXIDE 400 MG (241.3 MG MAGNESIUM) TABLET
400 TABLET ONCE
Status: COMPLETED | OUTPATIENT
Start: 2018-08-03 | End: 2018-08-03

## 2018-08-03 NOTE — PLAN OF CARE
Problem: Diabetes/Glucose Control  Goal: Glucose maintained within prescribed range  INTERVENTIONS:  - Monitor Blood Glucose as ordered  - Assess for signs and symptoms of hyperglycemia and hypoglycemia  - Administer ordered medications to maintain glucose Absence of cardiac arrhythmias or at baseline  INTERVENTIONS:  - Continuous cardiac monitoring, monitor vital signs, obtain 12 lead EKG if indicated  - Evaluate effectiveness of antiarrhythmic and heart rate control medications as ordered  - Initiate emerg

## 2018-08-03 NOTE — PROGRESS NOTES
[facility]  Cardiology Progress Note    Subjective:  No chest pain or shortness of breath.     Objective:  BP 94/53 (BP Location: Left arm)   Pulse (!) 131   Temp 98.5 °F (36.9 °C) (Oral)   Resp 22   Wt 180 lb 5.4 oz (81.8 kg)   SpO2 98%   BMI 24.46 kg encephalopathy  · DNR     Plan:  · Family and patient have decided to continue with hospice. Hospice to evaluate today. · Continue BB for HR control, increased HR suspected effect from increased work of breathing in end stage pulmonary fibrosis.      Zachery Mejia

## 2018-08-03 NOTE — PROGRESS NOTES
08/03/18 1200   Clinical Encounter Type   Visited With Patient  (son at bedside)   Routine Visit Follow-up   Continue Visiting No   Patient's Supportive Strategies/Resources  offered non anxious presence including active listening and acknowledg

## 2018-08-03 NOTE — PROGRESS NOTES
[facility]  Progress Note    Gerome Bumpers Patient Status:  Inpatient    1940 MRN [id number]   [facility] 2NE-A Attending Elvira Lozano MD   [facility] Day # 8 PCP Aimee Lomeli DO     Subjective:  Gerome Bumpers is a(n) 69year old HGB  13.0  12.6*  13.0  12.0*   HCT  42.5  43.1  43.5  40.2   MCV  74.0*  76.0*  75.5*  75.6*   MCH  22.6*  22.2*  22.6*  22.6*   MCHC  30.6*  29.2*  29.9*  29.9*   RDW  19.5*  18.6*  19.4*  19.4*   NEPRELIM  3.87   --    --   5.76   WBC  6.3  8.4  11.8

## 2018-08-03 NOTE — PALLIATIVE CARE NOTE
[address] Follow Up      Sunnie [place]  [id number]       Patient seen and evaluated, family at bedside. [facility] visiting with patient.     ROS: denies complaints    Physical Exam:  GEN:  dyspneic  Neuro:  Awake and a

## 2018-08-03 NOTE — PROGRESS NOTES
[facility]  Progress Note     Nataly Denver Patient Status:  Inpatient    1940 MRN [id number]   [facility] 2NE-A Attending Court Brand MD   [facility] Day # 8 PCP Satya Montelongo DO     Chief Complaint: right hand cramping     S: Oral Daily with breakfast   • ipratropium-albuterol  3 mL Nebulization 6 times per day   • levofloxacin  750 mg Intravenous Q48H   • insulin detemir  18 Units Subcutaneous Daily   • insulin detemir  15 Units Subcutaneous Nightly   • torsemide  20 mg Oral B

## 2018-08-03 NOTE — CM/SW NOTE
Nay, liaison from [facility], was here on the unit to meet with pt and family. The family is going to tour the [employer]. Cy advised that Pt's O2 needs are currently  too high for their IPU. The max O2 they can accommodate is 30L 40% FIO2.  They

## 2018-08-04 LAB
GLUCOSE BLD-MCNC: 139 MG/DL (ref 65–99)
GLUCOSE BLD-MCNC: 202 MG/DL (ref 65–99)
GLUCOSE BLD-MCNC: 255 MG/DL (ref 65–99)

## 2018-08-04 PROCEDURE — 99232 SBSQ HOSP IP/OBS MODERATE 35: CPT | Performed by: INTERNAL MEDICINE

## 2018-08-04 NOTE — PLAN OF CARE
Problem: Diabetes/Glucose Control  Goal: Glucose maintained within prescribed range  INTERVENTIONS:  - Monitor Blood Glucose as ordered  - Assess for signs and symptoms of hyperglycemia and hypoglycemia  - Administer ordered medications to maintain glucose edema, trend weights   Outcome: Progressing  Denies any chest pain or shortness of breath  Vital signs are stable  Goal: Absence of cardiac arrhythmias or at baseline  INTERVENTIONS:  - Continuous cardiac monitoring, monitor vital signs, obtain 12 lead EKG monitoring

## 2018-08-04 NOTE — PROGRESS NOTES
[facility]  Progress Note     Duane Spence Patient Status:  Inpatient    1940 MRN [id number]   [facility] 2NE-A Attending Stephanie Schmitz, MD   [address] Day # 9 PCP Dc Gamble DO     Chief Complaint: SOB    S: Patient feels ti Units Subcutaneous Daily   • insulin detemir  15 Units Subcutaneous Nightly   • torsemide  20 mg Oral BID (Diuretic)   • eplerenone  12.5 mg Oral Daily   • metoprolol succinate  25 mg Oral Nightly   • digoxin  125 mcg Oral Daily   • Insulin Aspart Pen  1-6

## 2018-08-04 NOTE — RESPIRATORY THERAPY NOTE
RT received a call from RN that patient was desaturations. [facility] Vap[facility]m increased to 30 liters/ 50% until patient recovered. At 1400 attempted to wean patient back to 25L but patient desaturated to 87%.   Currently pat

## 2018-08-04 NOTE — PROGRESS NOTES
[facility]  Progress Note    Karol Joya Patient Status:  Inpatient    1940 MRN [id number]   [facility] 2NE-A Attending Vlad Walker MD   [address] Day # 9 PCP Amie Iyer DO     Subjective:  Karol Joya is a(n) 69year old 5. 76  5.32   HGB  12.6*  13.0  12.0*   HCT  43.1  43.5  40.2   MCV  76.0*  75.5*  75.6*   MCH  22.2*  22.6*  22.6*   MCHC  29.2*  29.9*  29.9*   RDW  18.6*  19.4*  19.4*   NEPRELIM   --    --   5.76   WBC  8.4  11.8  8.2   PLT  242.0  277.0  200.0     Rece

## 2018-08-04 NOTE — PROGRESS NOTES
[facility] Card[facility] Progress Note    Subjective:  Seen earlier today. Feels bed is uncomfortable which made sleeping difficult.      Objective:  /61 (BP Location: Left arm)   Pulse 109   Temp 98.4 °F (36.9 °C) (Oral)   Resp 20   Wt 180 lb 5.4 oz (81.8

## 2018-08-05 LAB
ARTERIAL BLD GAS O2 SATURATION: 94 % (ref 92–100)
ARTERIAL BLOOD GAS BASE EXCESS: 1.5
ARTERIAL BLOOD GAS HCO3: 24.7 MEQ/L (ref 22–26)
ARTERIAL BLOOD GAS PCO2: 34 MM HG (ref 35–45)
ARTERIAL BLOOD GAS PH: 7.48 (ref 7.35–7.45)
ARTERIAL BLOOD GAS PO2: 73 MM HG (ref 80–105)
CALCULATED O2 SATURATION: 96 % (ref 92–100)
CARBOXYHEMOGLOBIN: 1.8 % SAT (ref 0–3)
DIGOXIN SERPL-MCNC: 1.16 NG/ML (ref 0.8–2)
FIO2: 60 %
GLUCOSE BLD-MCNC: 153 MG/DL (ref 65–99)
GLUCOSE BLD-MCNC: 182 MG/DL (ref 65–99)
GLUCOSE BLD-MCNC: 194 MG/DL (ref 65–99)
GLUCOSE BLD-MCNC: 219 MG/DL (ref 65–99)
L/M: 30 L/MIN
METHEMOGLOBIN: 0.5 % SAT (ref 0.4–1.5)
P/F RATIO: 121.6 MMHG
PATIENT TEMPERATURE: 98.6 F
TOTAL HEMOGLOBIN: 11.3 G/DL (ref 12.6–17.4)

## 2018-08-05 PROCEDURE — 99232 SBSQ HOSP IP/OBS MODERATE 35: CPT | Performed by: INTERNAL MEDICINE

## 2018-08-05 NOTE — PROGRESS NOTES
[facility] Progress Note    Subjective:  Some confusion during night per nursing. Pulled out sanabria catheter. Currently sleeping.      Objective:  /59 (BP Location: Left arm)   Pulse 98   Temp 98.4 °F (36.9 °C) (Axillary)   Resp 18   Wt 180 lb

## 2018-08-05 NOTE — CONSULTS
[facility] Consult Note:  Medication List Evaluation for Confusion    Radha Cedillo is a 69year old male admitted 7/26/2018 who has been more confused per MD evaluation.   Pharmacy has been consulted to evaluate his current medications for those that cou mEq Oral QOD   Sildenafil Citrate (REVATIO) tab 40 mg 40 mg Oral TID   magnesium oxide (MAG-OX) tab 250 mg 250 mg Oral Daily PRN   Nintedanib Esylate CAPS 150 mg 150 mg Oral 2 times per day   glucose (DEX4) oral liquid 15 g 15 g Oral Q15 Min PRN   Or

## 2018-08-05 NOTE — PROGRESS NOTES
[facility]  Progress Note     Janice Kevin Patient Status:  Inpatient    1940 MRN [id number]   [facility] 2NE-A Attending Lisa Moffett MD   Hosp Day # 10 PCP Thais Hdz DO     Chief Complaint: AMS    S: Patient is slee Oral BID (Diuretic)   • eplerenone  12.5 mg Oral Daily   • metoprolol succinate  25 mg Oral Nightly   • digoxin  125 mcg Oral Daily   • Insulin Aspart Pen  1-68 Units Subcutaneous TID CC   • Insulin Aspart Pen  1-68 Units Subcutaneous TID CC and HS   • Toya

## 2018-08-05 NOTE — RESPIRATORY THERAPY NOTE
SPOKE WITH RN, PT MENTAL STATE SEEMS WORSE- VERY CONFUSED NOT ALWAYS MAKING SENSE. WENT UP ON FIO2 TO 60% SPO2 FLOATING AROUND 86-88% ON 50%. NO RESP DISTRESS NOTED. WILL CONSULT WITH DOCTORS ON PLAN OF CARE.

## 2018-08-05 NOTE — PROGRESS NOTES
Pt. Restless , intermittent confusion , pulled out Wei cath , pulse ox. Ativan 0.5 mg IVP administered. 0135   IDFC inserted. Pt. Resting , not in any form of distress.

## 2018-08-05 NOTE — PLAN OF CARE
Problem: Diabetes/Glucose Control  Goal: Glucose maintained within prescribed range  INTERVENTIONS:  - Monitor Blood Glucose as ordered  - Assess for signs and symptoms of hyperglycemia and hypoglycemia  - Administer ordered medications to maintain glucose ADULT  Goal: Achieves optimal ventilation and oxygenation  INTERVENTIONS:  - Assess for changes in respiratory status  - Assess for changes in mentation and behavior  - Position to facilitate oxygenation and minimize respiratory effort  - Oxygen supplement

## 2018-08-05 NOTE — PROGRESS NOTES
[facility]  Progress Note    Lindmyahnn Fall Patient Status:  Inpatient    1940 MRN [id number]   [facility] 2NE-A Attending Elmer Kayser, MD   Hosp Day # 10 PCP Early DO Kiana     STATUS UPDATE: The patient demonstrated much c no   rebound, positive BS   Extremity: No edema, no cyanosis   Neurological: Alert, interactive, no focal deficits    Lab Data Review:  Recent Labs   Lab  07/31/18   0543  08/01/18   1324  08/02/18   0605   RBC  5.67  5.76  5.32   HGB  12.6*  13.0  12.0*

## 2018-08-06 LAB
GLUCOSE BLD-MCNC: 151 MG/DL (ref 65–99)
GLUCOSE BLD-MCNC: 185 MG/DL (ref 65–99)

## 2018-08-06 PROCEDURE — 99232 SBSQ HOSP IP/OBS MODERATE 35: CPT | Performed by: INTERNAL MEDICINE

## 2018-08-06 PROCEDURE — 99233 SBSQ HOSP IP/OBS HIGH 50: CPT | Performed by: CLINICAL NURSE SPECIALIST

## 2018-08-06 NOTE — PROGRESS NOTES
[facility]  Progress Note     Raphael Ni Patient Status:  Inpatient    1940 MRN [id number]   [facility] 2NE-A Attending Alejo Toure MD   [facility] Day # 6 PCP Leann Gramajo DO     Chief Complaint: hypoxia    S: Patient a b Nightly   • Sildenafil Citrate  40 mg Oral TID   • Nintedanib Esylate  150 mg Oral 2 times per day       ASSESSMENT / PLAN:     1. Acute on chronic hypoxic respiratory failure  1. On vapotherm - weaning as tolerated   2. Pulmonary following    2.  Acute on

## 2018-08-06 NOTE — CM/SW NOTE
SW met with Palliative Care RN and Raudel Clark- plan is now to request [facility] pt here for IP due to his high resp needs.   Family is aware and son is on his way in town from [place] and should be here by 5pm.    Leola Jacinto, 08

## 2018-08-06 NOTE — CM/SW NOTE
LILY called [facility] Intake office. Their RN Georges Kelly is here now. LILY met with Georges Kelly. They are able to accept pt with his present O2 levels. They have faxed consents to the son in [place].  He told Allen Santamaria that he wants to talk to Pulmonologist one mor

## 2018-08-06 NOTE — PROGRESS NOTES
[facility]  Cardiology Progress Note    Subjective:  Patient is pleasantly confused. Saying he feels comfortable and has no concerns at this time.      Objective:  /72 (BP Location: Left arm)   Pulse 107   Temp 98.9 °F (37.2 °C) (Oral)   Resp 18

## 2018-08-06 NOTE — PALLIATIVE CARE NOTE
[address] Follow Up      Jairo Mcmahon  [id number]       Patient seen and evaluated, no family at bedside.      ROS: reports dyspnea    Physical Exam:  GEN:  Gets agitated when talking about managing dyspnea  Neuro:  Awake, or

## 2018-08-06 NOTE — HOSPICE RN NOTE
Checked on pt and his O2 is at 30L 50% FiO2 but his SpO2 is in mid 90s. He seems more confused today and per nurse has not been receiving any morphine or ativan as he is refusing it. He was A&Ox3 but conversationally confused.  It is not clear if he is stil

## 2018-08-06 NOTE — HOSPICE RN NOTE
Spoke with patient's son Karol Chandra.  He will be meeting with [facility] tomorrow at [facility]. In talking with him he seemed to be more in favor of [facility] hospice as a way to have patient receive care in a more quiet environment and out of the hospi

## 2018-08-06 NOTE — CM/SW NOTE
Call from residential hosp[facility], will meet with son tomorrow 8/7 @ 0908 Elissa Roche RN,   Phone [phone]  Pager [phone]

## 2018-08-06 NOTE — PROGRESS NOTES
[facility]  Progress Note    Mj Block Patient Status:  Inpatient    1940 MRN [id number]   [facility] 2NE-A Attending Deann Villasenor MD   [facility] Day # 11 PCP Windy Oswald DO     Assessment:  · Severe, end stage idiopathic pu Troponin I above reference range     Acute respiratory failure with hypoxia (HCC)     Acute on chronic systolic (congestive) heart failure (HCC)     Acute on chronic diastolic (congestive) heart failure (HCC)     IPF (idiopathic pulmonary fibrosis) ([facility] 08/02/18   0605 08/03/18   1428   GLU  117*  153*  186*  181*  174*   BUN  31*  36*  34*  37*  51*   CREATSERUM  1.58*  1.48*  1.41*  1.59*  2.14*   GFRAA  48*  52*  56*  48*  34*   GFRNAA  42*  45*  48*  42*  29*   CA  9.0  8.9  9.0  8.4  8.7   NA  139 (REVATIO) tab 40 mg 40 mg Oral TID   magnesium oxide (MAG-OX) tab 250 mg 250 mg Oral Daily PRN   Nintedanib Esylate CAPS 150 mg 150 mg Oral 2 times per day       PEAK flow  PF Readings from Last 1 Encounters:  No data found for PF        Kendell Samaniego

## 2018-08-07 VITALS
SYSTOLIC BLOOD PRESSURE: 129 MMHG | DIASTOLIC BLOOD PRESSURE: 73 MMHG | HEART RATE: 110 BPM | OXYGEN SATURATION: 92 % | WEIGHT: 181.19 LBS | RESPIRATION RATE: 20 BRPM | BODY MASS INDEX: 25 KG/M2 | TEMPERATURE: 98 F

## 2018-08-07 PROCEDURE — 99232 SBSQ HOSP IP/OBS MODERATE 35: CPT | Performed by: INTERNAL MEDICINE

## 2018-08-07 NOTE — HOSPICE RN NOTE
Met with son Elizabeth Canchola again at his request and he signed consents for patient to transfer to [facility]. Dr Christal Penny advised and discharged ordered. [address] ambulance arranged by RAMON Patterson for 1:30 pm  today with Bipap at 14/8 100% O2.   Pulm and ca

## 2018-08-07 NOTE — PROGRESS NOTES
[facility]  Progress Note    Panchito Lowry Patient Status:  Inpatient    1940 MRN [id number]   [facility] 2NE-A Attending Sherlyn Schultz MD   [facility] Day # 12 PCP Cally Maxwell DO     Assessment:  · Severe, end stage idiopathic Chronic respiratory failure with hypoxia (HCC)     Essential hypertension     Encephalopathy acute     Acute cystitis without hematuria     Urinary retention     Goals of care, counseling/discussion     Palliative care encounter     PVC (premature ventricu @MG@      Lab Results  Component Value Date   INR 1.09 03/14/2018   INR 1.00 09/25/2017   INR 1.06 01/04/2017        No results for input(s): TSH in the last 72 hours.     Recent Labs   Lab  08/05/18   1219   ABGPHT  7.48*   [id number]  34*   [id number]  73*

## 2018-08-07 NOTE — PLAN OF CARE
Assumed care of patient around 0730. Pt very confused this a.m., pt is unable to remember where he is at, why he is here, states he wants to go home, but he doesn't know where he lives. Becomes agitated at times when he is unsure of what is going on.  Pt is

## 2018-08-07 NOTE — PLAN OF CARE
CARDIOVASCULAR - ADULT    • Maintains optimal cardiac output and hemodynamic stability Completed    • Absence of cardiac arrhythmias or at baseline Completed        Impaired Functional Mobility    • Achieve highest/safest level of mobility/gait Completed

## 2018-08-07 NOTE — CM/SW NOTE
08/07/18 1104   Discharge disposition   Expected discharge disposition Hospice/Medi   Name of [address]   [facility]     Informed by [facility] patient/family have decided to transfer to [facility]

## 2018-08-07 NOTE — PROGRESS NOTES
Adddendum:   Chart reviewed and patient examined + agree with plan. [facility]  Cardiology Progress Note    Subjective:  Patient is pleasantly confused. Saying he feels comfortable and has no concerns at this time.      Objective:  BP (!) 111/98 (BP Loc

## 2018-08-08 NOTE — PROGRESS NOTES
[facility]  Progress Note     Nile Beaver Patient Status:  Inpatient    1940 MRN [id number]   [facility] 2NE-A Attending Pernell Castleman, MD   [facility] Day # 15 PCP Eduardo Puri DO     Chief Complaint: confused    S: Patient confu PRN; vapotherm   4. Presumed UTI-ruled out per urine cx  5. urinary retention- sanabria in place   6. Acute Encephalopathy  1. TME from hypoxia/ delirium  2. Haldol Hallie Blood PRN  7. Pulm HTN  8. Chronic Hypoxic Respiratory Failure  1. As above   9. CAD  10.  AK

## 2018-09-19 NOTE — DISCHARGE SUMMARY
[facility]  DISCHARGE SUMMARY     Radha Cedillo Patient Status:  Inpatient    1940 MRN [id number]   [facility] 2NE-A Attending Miguel Rowe MD   [facility] Day # 15 PCP Jose Thomas DO     Date of Admission: 2018  Date of Dis daily as needed for Sleep or Anxiety. Refills:  0     Saline Nasal Spray 0.65 % Soln  Commonly known as:  SALINE MIST      1 spray by Nasal route 4 (four) times daily as needed for congestion.    Refills:  0        STOP taking these medications    kameron

## 2019-02-28 VITALS
HEART RATE: 90 BPM | HEIGHT: 72 IN | BODY MASS INDEX: 28.71 KG/M2 | WEIGHT: 212 LBS | SYSTOLIC BLOOD PRESSURE: 142 MMHG | DIASTOLIC BLOOD PRESSURE: 76 MMHG

## 2019-02-28 VITALS — HEART RATE: 108 BPM | DIASTOLIC BLOOD PRESSURE: 65 MMHG | SYSTOLIC BLOOD PRESSURE: 120 MMHG | WEIGHT: 209 LBS

## 2019-02-28 VITALS
HEIGHT: 72 IN | HEART RATE: 78 BPM | SYSTOLIC BLOOD PRESSURE: 120 MMHG | DIASTOLIC BLOOD PRESSURE: 68 MMHG | WEIGHT: 214.3 LBS | BODY MASS INDEX: 29.02 KG/M2

## 2019-02-28 VITALS
BODY MASS INDEX: 28.58 KG/M2 | HEIGHT: 72 IN | SYSTOLIC BLOOD PRESSURE: 124 MMHG | DIASTOLIC BLOOD PRESSURE: 60 MMHG | HEART RATE: 94 BPM | WEIGHT: 211 LBS

## 2019-02-28 VITALS — DIASTOLIC BLOOD PRESSURE: 58 MMHG | HEIGHT: 72 IN | SYSTOLIC BLOOD PRESSURE: 100 MMHG | HEART RATE: 75 BPM

## 2019-02-28 VITALS
HEART RATE: 112 BPM | WEIGHT: 204 LBS | DIASTOLIC BLOOD PRESSURE: 62 MMHG | BODY MASS INDEX: 27.63 KG/M2 | SYSTOLIC BLOOD PRESSURE: 112 MMHG | HEIGHT: 72 IN | OXYGEN SATURATION: 95 %

## 2019-02-28 VITALS
HEIGHT: 72 IN | OXYGEN SATURATION: 90 % | DIASTOLIC BLOOD PRESSURE: 60 MMHG | WEIGHT: 204 LBS | BODY MASS INDEX: 27.63 KG/M2 | SYSTOLIC BLOOD PRESSURE: 118 MMHG | HEART RATE: 100 BPM

## 2019-02-28 VITALS
HEIGHT: 72 IN | SYSTOLIC BLOOD PRESSURE: 124 MMHG | WEIGHT: 214 LBS | BODY MASS INDEX: 28.99 KG/M2 | HEART RATE: 50 BPM | DIASTOLIC BLOOD PRESSURE: 64 MMHG

## 2019-02-28 VITALS
HEART RATE: 75 BPM | SYSTOLIC BLOOD PRESSURE: 128 MMHG | BODY MASS INDEX: 28.71 KG/M2 | DIASTOLIC BLOOD PRESSURE: 60 MMHG | HEIGHT: 72 IN | WEIGHT: 212 LBS

## 2019-02-28 VITALS
HEART RATE: 85 BPM | BODY MASS INDEX: 28.44 KG/M2 | WEIGHT: 210 LBS | HEIGHT: 72 IN | SYSTOLIC BLOOD PRESSURE: 118 MMHG | DIASTOLIC BLOOD PRESSURE: 68 MMHG

## 2019-03-01 VITALS
SYSTOLIC BLOOD PRESSURE: 118 MMHG | HEIGHT: 72 IN | WEIGHT: 215 LBS | BODY MASS INDEX: 29.12 KG/M2 | HEART RATE: 75 BPM | DIASTOLIC BLOOD PRESSURE: 64 MMHG

## 2019-03-01 VITALS
HEART RATE: 85 BPM | SYSTOLIC BLOOD PRESSURE: 118 MMHG | HEIGHT: 72 IN | WEIGHT: 220 LBS | DIASTOLIC BLOOD PRESSURE: 60 MMHG | BODY MASS INDEX: 29.8 KG/M2

## 2020-11-25 NOTE — ED NOTES
Pt reevaluated by er physician. Pt informed of all test reports. Pt was advised admission. Pt verbalizing understanding. Script signed

## 2021-01-26 DIAGNOSIS — Z23 NEED FOR VACCINATION: ICD-10-CM

## 2025-06-16 NOTE — PLAN OF CARE
NURSE ANTICOAGULATION VISIT    Jace Machuca 1935 presents to clinic today for 4 week INR appointment    No outpatient medications have been marked as taking for the 6/16/25 encounter (Anti-Coag) with [facility].       INR (no units)   Date Value   06/16/2025 2.3        GOAL RANGE: 2.0-3.0    ALLERGIES:  No Known Allergies    Patient Active Problem List   Diagnosis    Long term (current) use of anticoagulants    Permanent atrial fibrillation  (CMD)    Anticoagulation goal of INR 2 to 3    Encounter for therapeutic drug level monitoring       PATIENT REPORTED CHANGES: No changes with medications/diet or concerns currently per patient.     NURSE DOSING ADJUSTMENTS AND EDUCATION: Continue current dose.   New dosage sheet provided.        Patient will recheck INR in 4 weeks, sooner if changes or concerns develop.  Warfarin Management done via face to face.  Reviewed signs and symptoms of bleeding and clotting with patient.  Reviewed and reinforced with patient the importance of calling the clinic with any medication, diet, and health related changes.  Importance of adherence to consistent diet in Vitamin K reviewed.  Affects of alcohol consumption and with concurrent use of Warfarin explained to patient.  Patient verbalized understanding.  Sent to Dr. Carcamo for review and signature.      Kathleen Otto RN     Diabetes/Glucose Control    • Glucose maintained within prescribed range Completed       accu check discontinued         CARDIOVASCULAR - ADULT    • Maintains optimal cardiac output and hemodynamic stability Progressing    • Absence of cardiac arrhythmias

## (undated) NOTE — IP AVS SNAPSHOT
Patient Demographics     Address  [place] [address] [address] [address] Phone  [phone] [facility])  [phone] (Mobile) *Preferred* E-mail Address  [email or web address]      Emergency Contact(s)     Name Relation Home Work Mobile    uElalio Pitts [id number] ipratropium-albuterol (DUONEB) nebulizer solution 3 mL 08/06/18 1625 Given      [id number] ipratropium-albuterol (DUONEB) nebulizer solution 3 mL 08/06/18 2310 Given      [id number] ipratropium-albuterol (DUONEB) nebulizer solution 3 mL 08/07/18 0 Author:  Lisa Moffett MD Service:  Cardiology Author Type:  Physician    Filed:  7/27/2018  8:41 AM Date of Service:  7/26/2018  4:59 PM Status:  Addendum    :  Lisa Moffett MD (Physician)    Related Notes:  Original Note by Augustina Small negative. CV: peripheral edema, see CV exam. RESP: dyspnea on exertion and O2-12 L. GI: denies melena, hematochezia. : no hematuria. INTEG: no new rashes, lesions. MS: no limiting arthritis. NEURO: no localized deficits. HEM/LYMPH: denies easy bruising. rhythm, normal S1, S2 without S3 and S4 present; 1/6 systolic murmur. CAROTIDS: carotid pulses normal. ABD AORTA: difficult to assess abdominal aorta. FEM: normal. PEDAL: pedal pulses intact. EXT: 2+ edema bilaterally to knees.      LABORATORY DATA: [00] 11/14/17 *Metoprolol Succinate 25MG      1/2 TABLET DAILY.                         11/14/17 *Potassium Chloride En94FTU     1 TABLET EVERY DAY                       07/26/18 Torsemide             20MG      1 tab twice daily                        04/26/18 O [On 07/26/2018, Seema Montez, a 69year old male, presented with dyspnea and edema.]    Patient is here for one-week follow-up. He presented last week with increased lower extremity edema and shortness of breath.   He was sent to the heart failure clinic cardiac catheterization 2017    FAMILY HISTORY: Negative for premature CAD. Negative for AAA. SOCIAL HISTORY: SMOKING: Former tobacco use. quit 2/02/03, smoked 2ppd X 48 yrs. CAFFEINE: 3-4 beverages daily and other none caf. . ALCOHOL: denies drinking.  EXE request pulmonary consult to follow  4. CAD with remote PCI and prior MI. Previous admission mildly elevated troponin not consistent with ACS. 5.  History of AAA repair 2004  6. Hypertension, adequately controlled  7.   Renal insufficiency, labs on admi Electronically signed by MARIO Montes on 7/26/2018  5:02 PM   Attribution Richardson    JK. 1 - MARIO Montes on 7/26/2018  4:59 PM                        Consults - MD Consult Notes      Consults signed by Lea Sweet MD at 7/27/2018  3:55 PM No recent falls no recent URIs. He has minimal cough nonproductive denies any fevers chills or sweats-on admission to his legs had been burning significantly without weeping now improved.     History:[TZ.1]  Past Medical History:   Diagnosis Date   • Anxie ALPRAZolam 0.25 MG Oral Tab Take 0.25 mg by mouth 3 (three) times daily as needed for Sleep or Anxiety. Disp:  Rfl:  Past Week at Unknown time   gabapentin 300 MG Oral Cap Take 1 capsule (300 mg total) by mouth 3 (three) times daily.  Disp: 270 capsule Rfl: deny any difficulty swallowing  Respiratory: As above  Cardiovascular:  as above denies any chest pains unaware of any palpitations  Gastrointestinal: Denies any reflux or heartburn no nausea vomiting or diarrhea  Musculoskeletal: Lower extremity edema as Skin: No rashes or lesions.   Other than legs   Neurological: Alert, interactive, no focal deficits currently fully intact    Lab Data Review:[TZ.1]    Lab Results  Component Value Date   WBC 7.6 07/27/2018   HGB 12.6 07/27/2018   HCT 41.3 07/27/2018   PLT #Severe pulmonary hypertension with known RV dysfunction-had improved some on sildenafil now with increasing volume overload-ongoing Bumex drip-repeat echo pending    #Severe hypoxia-demonstrated to be essentially all V/Q mismatch-remains limiting factor - Transthoracic Echocardiogram Name:Dominic Pitts Date: 2018 :  1940 Ht:  (72in)  BP: 103 / 68 MRN:  [id number]    Age:  77years    Wt:  (195lb) HR: 104bpm Loc:  EDW        Gndr: M          BSA: 2.11m^2 Sonographer: Sera OLMEDO Ordering:    Ki moderately increased, estimated    to be 52mm Hg. Impressions: This study is compared with previous dated 02/27/2018: Compared to the prior study, there has been no significant interval change.  * ----------------------------------------------------------- ---------------------------------------------------------------------------- Measurements  Left ventricle                                  Value        Reference  LV ID, ED, PLAX                                 4.7   cm     4.2 - 5.9  LV ID, ES, PLAX Mitral valve                                    Value        Reference  Mitral E-wave peak velocity                     0.59  m/sec  ---------  Mitral A-wave peak velocity                     1.1   m/sec  ---------  Mitral E/A ratio, peak 9/11/2018 1:00 PM Olga Vargas MD [address], ENT - [address]

## (undated) NOTE — ED AVS SNAPSHOT
[facility] Emergency Department    [place]  [address] [place] [address]    Phone:  [phone]    Fax:  [phone]           Marylene Roll   MRN: [id number]    Department:  [facility] Emergency Department   Date of Visit:  1/24/2 ?Stuffiness or a blocked feeling in the nose  ? Feelings of pressure or fullness in the face  ? Trouble smelling  Will I need tests? — Maybe.  Your doctor or nurse will probably be able to tell if you have nasal polyps by doing an exam. He or she will probabl To Check ER Wait Times:  TEXT 'ERwait' to 14750      Click [email or web address]      Or call [phone]    If you have any problems with your follow-up, please call our  at [phone]    Si usted tiene algun problema con bahena sequimiento, por f I have read and understand the instructions given to me by my caregivers. 24-Hour Pharmacies        Pharmacy Address Phone Number   [phone] ([address]) [address] [place] [address].  [facility] & visit,  view other health information, and more. To sign up or find more information, go to [email or web address] and click on the Sign Up Now link in the [employer] box.      Enter your [employer] Activation Code exactly as it appears below along with yo

## (undated) NOTE — IP AVS SNAPSHOT
[facility] [place] [address] [place] [address], [address] ~ [phone]                [facility]   [facility]           Admission Information        Provider Department    2/13/2017 Urmila Bansal MD  Blair Scot Potassium Chloride ER 10 MEQ Tbcr   Commonly known as:  K-DUR   What changed: You were already taking a medication with the same name, and this prescription was added. Make sure you understand how and when to take each.         Take 1 tablet (10 mEq total Apply to each nostril twice a day    Robert Gregorio     [    ]    [    ]    [    ]    [    ]       OFEV 150 MG Caps   Generic drug:  Nintedanib Esylate        Take 1 capsule by mouth 2 (two) times daily.       [    ]    [    ]    [    ]    [    ]       [phone]  [phone]        Future Appointments     Feb 13, 2017  1:55 PM   PULMONARY REHAB PHASE II with [facility] 4148 Cardiopulmonary Rehabilitation [place] Tr[place] [address] [address]   [phone]            Mar 03, 2017  1:55 PM   PULMONARY REHAB PHASE II with [address] Cardiopulmonary Rehabilitation hospitals)    [place]  [place] [phone]            Mar 22, 2017  1:55 PM   PULMONARY REHAB PHASE II with [facility] 6430 Cardiopulmonary Rehabilitation [facility])    [place] [address] [phone] Apr 07, 2017  1:55 PM   PULMONARY REHAB PHASE II with [address] [facility] 2197 Cardiopulmonary Rehabilitation \[facility]")    [place]  [place] [address]   [phone]            Apr 10, 2017  1:55 PULMONARY REHAB PHASE II with [address] [facility] 6300 Cardiopulmonary Rehabilitation [facility])    [facility]  [address] [address]   [phone]            Apr 28, 2017  1:55 PM   PULMONARY REHAB PHA 3.9 (01/06/17)  105      Radiology Exams     None         Additional Information       We are concerned for your overall well being:    - If you are a smoker or have smoked in the last 12 months, we encourage you to explore options for quitting.     - If y prescribed to take and their potential SIDE EFFECTS. Your nurse will review your medications with you before you are discharged, and can provide you with additional printed information.  Not all patients will experience these side effects or respond to [facility] Most common side effects: Pain, fever, rash, fatigue, joint pain, blood clots, high blood pressure   What to report to your healthcare team: Pain, swelling, rash, fever           Blood Sugar Medications     insulin detemir 100 UNIT/ML Subcutaneous Solution Potassium Chloride ER 10 MEQ Oral Tab CR    Acetylcysteine 500 MG Oral Cap    mupirocin 2 % External Ointment    Cholecalciferol (VITAMIN D3) 1000 UNITS Oral Cap    magnesium 250 MG Oral Tab    Multiple Vitamins-Minerals (MULTIVITAMIN & MINERAL OR)

## (undated) NOTE — ED AVS SNAPSHOT
[facility] Emergency Department    [place]  [address] [place] [address]    Phone:  [phone]    Fax:  [phone]           Sunnie Denver   MRN: [id number]    Department:  [facility] Emergency Department   Date of Visit:  1/24/2 IF THERE IS ANY CHANGE OR WORSENING OF YOUR CONDITION, CALL YOUR PRIMARY CARE PHYSICIAN AT ONCE OR RETURN IMMEDIATELY TO THE EMERGENCY DEPARTMENT.     If you have been prescribed any medication(s), please fill your prescription right away and begin taking t

## (undated) NOTE — IP AVS SNAPSHOT
[facility] [place] [address] [place], [address] ~ [phone]                Discharge Summary   1/4/2017    [facility]ads           Admission Information        Provider Department    1/4/2017 Jason Bolivar MD  2njayesh-OTYIN Díaz Inject 1,000 mcg into the muscle every 30 (thirty) days. furosemide 40 MG Tabs   Last time this was given:  40 mg on 1/6/2017  8:28 AM   Commonly known as:  LASIX   Next dose due:  1/7/17 0900        Take 40 mg by mouth daily. Saline Nasal Spray 0.65 % Soln   Last time this was given:  1 spray on 1/5/2017  2:55 AM   Commonly known as:  SALINE MIST        1 spray by Nasal route 4 (four) times daily as needed for congestion.                             Vitamin D3 1000 UNITS Caps Neutrophil % Lymphocyte % Monocyte % Eosinophil % Basophil % Prelim Neut Abs Final Neut Abs Lymphocyte Abso Monocyte Absolu Eosinophil Abso Basophil Absolu    (01/04/17)  73.2 (01/04/17)  16.9 (01/04/17)  6.8 (01/04/17)  2.4 (01/04/17)  0.3 -- (01/04/17) [employer]     Sign up for [employer], your secure online medical record. [employer] will allow you to access patient instructions from your recent visit,  view other health information, and more. To sign up or find more information, go to [email or web address]. Claribel Joselito Use: Treat high blood pressure, swelling in legs, too much fluid    Most common side effects: Dizziness, loss of potassium (increased urination is expected)   What to report to your healthcare team: Dizziness, decreased urine amount           Cholesterol Use:  Treatment of asthma, COPD/emphysema, cough, allergies   Most common side effects: Headache, nasal irritation, palpitations, increased heart rate, increased blood pressure, allergic reaction, yeast infection of the mouth/tongue   What to report to yo

## (undated) NOTE — IP AVS SNAPSHOT
Patient Demographics     Address  [place] [address] [id number] [address] Phone  [phone] [facility])  [phone] (Mobile) *Preferred* E-mail Address  [email or web address]      Emergency Contact(s)     Name Relation Home Work Mobile    Eulalio Pitts 3. Limit your fluid intake to no more than 2 liters or 64 ounces per day    4. Some exercise and activity is important to help keep your heart functioning and strong.  Unless instructed not to exercise, you may walk at a slow to moderate pace for 10-15 simeon Alie Daniel DO. Schedule an appointment as soon as possible for a visit in 1 week.     Specialty:  Family Medicine  Contact information:  [facility] 1103 [phone]                  Your medication list      TAKE these medica Take 75 mg by mouth nightly. Potassium Chloride ER 10 MEQ Tbcr  Commonly known as:  K-DUR  Next dose due:  07/03/18 in the morning. Take 1 tablet (10 mEq total) by mouth every other day.    Karla Alegria MD         Pravastatin Sodium 10 [id number] Clopidogrel Bisulfate (PLAVIX) tab 75 mg 07/02/18 0910 Given      [id number] Nintedanib Esylate CAPS 150 mg 07/01/18 2108 Given      [id number] Nintedanib Esylate CAPS 150 mg 07/02/18 0910 Given      [id number] Potassium Chloride ER (K-DUR) CR tab Ordering provider:  Mignon Johnston MD  07/02/18 0845 Resulting lab:  [facility]    Specimen Information    Type Source Collected On   Blood — 07/02/18 0858          Components    Component Value Reference Range Flag Lab   Glucose 99 70 - 99 mg/dL Estephanie Diane Author:  Jessica Joseph MD Service:  (none) Author Type:  Physician    Filed:  6/27/2018 10:55 PM Date of Service:  6/27/2018  8:36 PM Status:  Signed    :  Jessica Joseph MD (Physician)         [facility]  History and Physical     Talya Perez No date: CATH PERCUTANEOUS  TRANSLUMINAL CORONARY ANGIO*  9/12/2014: COLONOSCOPY      Comment: Procedure: COLONOSCOPY;  Surgeon: Verona Diehl MD;  Location: [address] [place] [address] ENDOSCOPY  No date: COLONOSCOPY  No date: Ysitie 71 times daily as needed for congestion. Disp:  Rfl:    Cholecalciferol (VITAMIN D3) 1000 UNITS Oral Cap Take 1,000 Units by mouth 2 (two) times daily. Disp:  Rfl:    Pravastatin Sodium (PRAVACHOL) 10 MG Oral Tab Take 10 mg by mouth nightly.    Disp:  Rfl: Psychiatric: Appropriate mood and affect.   Diagnostic Data:    Labs:  Recent Labs   Lab  06/27/18   1549   WBC  6.3   HGB  11.1*   MCV  74.8*   PLT  196.0     Recent Labs   Lab  06/27/18   1549   GLU  119*   BUN  20   CREATSERUM  1.39*   GFRAA  57*   GFRNA Juwan Emmanuel Patient Status:  Inpatient    1940 MRN [id number]   [facility] 8NE-A Attending Dave Paula MD   [facility] Day # 1 PCP Astrid Client,      Reason for Consultation:  hypoxia    History of Present Illness:  Juwan Emmanuel is a reports that he quit smoking about 17 years ago. He smoked 2.00 packs per day.  He has never used smokeless tobacco.    Allergies:    Darvon [Propoxyphen*      Penicillins                 Comment:UNSURE OF REACTION  Red Wine Extract            Medications: nasal congestion, snoring, sore throat, hoarseness and voice change. Respiratory: Negative for cough, sputum, hemoptysis, chest pain, wheezing, + dyspnea on exertion, no stridor.   Cardiovascular: Negative for chest pain, palpitations, irregular heart beat 06/27/18 1845 125/81 - - 96 19 100 % - -   06/27/18 1830 128/83 - - 95 19 100 % - -   06/27/18 1815 124/80 - - 92 20 100 % - -   06/27/18 1800 122/68 - - 93 21 100 % - -   06/27/18 1745 118/82 - - 93 25 100 % - -   06/27/18 1730 107/78 - - 93 24 97 % - - Radiology:  CXR 6/28 - reviewed - hard to tell much difference from March.  No clear signs of effusion of engorged vasculature    Assessment:  · Acute on chronic respiratory failure  · IPF  · PHTN  · HTN  · DM  · GISELLA      Plan:  · Unclear etiology of symp which may represent fibrotic changes although superimposed mild edema or infectious etiology cannot be excluded. . 3.  Interval improvement in right basilar opacity since prior examination.      Dictated by: Johnathan Donovan MD on 7/02/2018 at 10:20     Appr peripheral edema. Pt diagnosed with end stage idiopathic pulmonary fibrosis. Therapy significant imaging (date, test, result):  Xray chest 6/27 -   CONCLUSION:  Moderate to severe fibrotic changes of the lungs are similar to prior exams.   This Jb Gordon Comment: CERVICAL FUSION AND LUMBAR LAMINECTOMY    HOME SITUATION  Type of Home: Assisted living facility   Home Layout: One level  Stairs to Enter : 0     Stairs to Bedroom: 0       Lives With: Staff 24 hours  Drives: Yes  Patient Owned Equipment: Non -   Moving from lying on back to sitting on the side of the bed?: A Little   How much help from another person does the patient currently need. ..   -   Moving to and from a bed to a chair (including a wheelchair)?: A Little   -   Need to walk in hospital r Gait training  Posture  ROM  Transfer training    Patient End of Session: Up in chair;Needs met;Call light within reach;RN aware of session/findings; All patient questions and concerns addressed; Alarm set    ASSESSMENT   Patient is a 69year old male admitt Occupational Therapy Notes (last 72 hours) (Notes from 6/29/2018  1:53 PM through 7/2/2018  1:53 PM)      Occupational Therapy Note signed by Ranulfo Fountain at 7/2/2018  9:45 AM  Version 1 of 1    Author:  Ranulfo Fountain Service:  Rehab Author Type:  OT St Active Problems:    Pulmonary fibrosis (HCC)    Pulmonary hypertension (HCC)    Acute congestive heart failure, unspecified heart failure type (HCC)    Troponin I above reference range    Acute respiratory failure with hypoxia [facility])      Past Medical Histo finding himself using his scooter even inside the apartment. Pt states it takes him 30 mins from the lobby to his car, finds himself leaning on something to rest every 15 feet. Pt still drives.   Pt has a scooter that he rides from his apartment to dining r -   Taking care of personal grooming such as brushing teeth?: None  -   Eating meals?: None    AM-PAC Score:  Score: 20  Approx Degree of Impairment: 38.32%  Standardized Score (AM-PAC Scale): 42.03  CMS Modifier (G-Code): LAKHWINDER    FUNCTIONAL TRANSFER ASSESSM detailed assessments, several treatment options    Overall Complexity MODERATE     OT Discharge Recommendations: Assisted living (Back to [facility])  OT Device Recommendations: None    PLAN  OT Treatment Plan: Balance activities; Energy conservation/work